# Patient Record
Sex: FEMALE | Race: WHITE | NOT HISPANIC OR LATINO | Employment: OTHER | URBAN - METROPOLITAN AREA
[De-identification: names, ages, dates, MRNs, and addresses within clinical notes are randomized per-mention and may not be internally consistent; named-entity substitution may affect disease eponyms.]

---

## 2018-04-09 ENCOUNTER — HOSPITAL ENCOUNTER (OUTPATIENT)
Dept: RADIOLOGY | Facility: MEDICAL CENTER | Age: 83
End: 2018-04-09

## 2018-04-09 ENCOUNTER — HOSPITAL ENCOUNTER (INPATIENT)
Facility: MEDICAL CENTER | Age: 83
LOS: 9 days | DRG: 854 | End: 2018-04-18
Attending: EMERGENCY MEDICINE | Admitting: INTERNAL MEDICINE
Payer: MEDICARE

## 2018-04-09 DIAGNOSIS — L02.91 ABSCESS: ICD-10-CM

## 2018-04-09 DIAGNOSIS — S72.002A CLOSED FRACTURE OF LEFT HIP, INITIAL ENCOUNTER (HCC): ICD-10-CM

## 2018-04-09 DIAGNOSIS — S72.002S CLOSED LEFT HIP FRACTURE, SEQUELA: Chronic | ICD-10-CM

## 2018-04-09 DIAGNOSIS — M00.9 SEPTIC HIP (HCC): ICD-10-CM
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
## 2018-04-09 PROBLEM — R53.81 DEBILITY: Chronic | Status: ACTIVE | Noted: 2018-04-09

## 2018-04-09 PROBLEM — I10 HYPERTENSION: Chronic | Status: ACTIVE | Noted: 2018-04-09

## 2018-04-09 PROBLEM — K59.00 CONSTIPATION: Chronic | Status: ACTIVE | Noted: 2018-04-09

## 2018-04-09 PROBLEM — Z87.81 S/P LEFT HIP FRACTURE: Chronic | Status: ACTIVE | Noted: 2018-04-09

## 2018-04-09 PROBLEM — D64.9 ANEMIA: Status: ACTIVE | Noted: 2018-04-09

## 2018-04-09 PROBLEM — M86.9 OSTEOMYELITIS (HCC): Status: ACTIVE | Noted: 2018-04-09

## 2018-04-09 PROBLEM — F03.90 DEMENTIA (HCC): Chronic | Status: ACTIVE | Noted: 2018-04-09

## 2018-04-09 LAB
ALBUMIN SERPL BCP-MCNC: 2.2 G/DL (ref 3.2–4.9)
ALBUMIN/GLOB SERPL: 0.6 G/DL
ALP SERPL-CCNC: 82 U/L (ref 30–99)
ALT SERPL-CCNC: 11 U/L (ref 2–50)
ANION GAP SERPL CALC-SCNC: 10 MMOL/L (ref 0–11.9)
AST SERPL-CCNC: 17 U/L (ref 12–45)
BASOPHILS # BLD AUTO: 0.3 % (ref 0–1.8)
BASOPHILS # BLD: 0.06 K/UL (ref 0–0.12)
BILIRUB SERPL-MCNC: 0.4 MG/DL (ref 0.1–1.5)
BUN SERPL-MCNC: 32 MG/DL (ref 8–22)
CALCIUM SERPL-MCNC: 7.9 MG/DL (ref 8.5–10.5)
CHLORIDE SERPL-SCNC: 102 MMOL/L (ref 96–112)
CO2 SERPL-SCNC: 23 MMOL/L (ref 20–33)
CREAT SERPL-MCNC: 1.06 MG/DL (ref 0.5–1.4)
CRP SERPL HS-MCNC: 26.47 MG/DL (ref 0–0.75)
EKG IMPRESSION: NORMAL
EOSINOPHIL # BLD AUTO: 0.1 K/UL (ref 0–0.51)
EOSINOPHIL NFR BLD: 0.4 % (ref 0–6.9)
ERYTHROCYTE [DISTWIDTH] IN BLOOD BY AUTOMATED COUNT: 45.1 FL (ref 35.9–50)
ERYTHROCYTE [SEDIMENTATION RATE] IN BLOOD BY WESTERGREN METHOD: 104 MM/HOUR (ref 0–30)
GLOBULIN SER CALC-MCNC: 3.4 G/DL (ref 1.9–3.5)
GLUCOSE SERPL-MCNC: 75 MG/DL (ref 65–99)
HCT VFR BLD AUTO: 30 % (ref 37–47)
HGB BLD-MCNC: 10 G/DL (ref 12–16)
IMM GRANULOCYTES # BLD AUTO: 0.31 K/UL (ref 0–0.11)
IMM GRANULOCYTES NFR BLD AUTO: 1.3 % (ref 0–0.9)
LYMPHOCYTES # BLD AUTO: 2.38 K/UL (ref 1–4.8)
LYMPHOCYTES NFR BLD: 10.2 % (ref 22–41)
MCH RBC QN AUTO: 30.3 PG (ref 27–33)
MCHC RBC AUTO-ENTMCNC: 33.3 G/DL (ref 33.6–35)
MCV RBC AUTO: 90.9 FL (ref 81.4–97.8)
MONOCYTES # BLD AUTO: 1.11 K/UL (ref 0–0.85)
MONOCYTES NFR BLD AUTO: 4.7 % (ref 0–13.4)
NEUTROPHILS # BLD AUTO: 19.41 K/UL (ref 2–7.15)
NEUTROPHILS NFR BLD: 83.1 % (ref 44–72)
NRBC # BLD AUTO: 0 K/UL
NRBC BLD-RTO: 0 /100 WBC
PLATELET # BLD AUTO: 427 K/UL (ref 164–446)
PMV BLD AUTO: 9.5 FL (ref 9–12.9)
POTASSIUM SERPL-SCNC: 3.7 MMOL/L (ref 3.6–5.5)
PROT SERPL-MCNC: 5.6 G/DL (ref 6–8.2)
RBC # BLD AUTO: 3.3 M/UL (ref 4.2–5.4)
SODIUM SERPL-SCNC: 135 MMOL/L (ref 135–145)
WBC # BLD AUTO: 23.4 K/UL (ref 4.8–10.8)

## 2018-04-09 PROCEDURE — 160002 HCHG RECOVERY MINUTES (STAT): Performed by: ORTHOPAEDIC SURGERY

## 2018-04-09 PROCEDURE — 86140 C-REACTIVE PROTEIN: CPT

## 2018-04-09 PROCEDURE — 87070 CULTURE OTHR SPECIMN AEROBIC: CPT

## 2018-04-09 PROCEDURE — 80053 COMPREHEN METABOLIC PANEL: CPT

## 2018-04-09 PROCEDURE — 700111 HCHG RX REV CODE 636 W/ 250 OVERRIDE (IP): Performed by: STUDENT IN AN ORGANIZED HEALTH CARE EDUCATION/TRAINING PROGRAM

## 2018-04-09 PROCEDURE — 87205 SMEAR GRAM STAIN: CPT

## 2018-04-09 PROCEDURE — 85025 COMPLETE CBC W/AUTO DIFF WBC: CPT

## 2018-04-09 PROCEDURE — 36415 COLL VENOUS BLD VENIPUNCTURE: CPT

## 2018-04-09 PROCEDURE — 87075 CULTR BACTERIA EXCEPT BLOOD: CPT

## 2018-04-09 PROCEDURE — 500891 HCHG PACK, ORTHO MAJOR: Performed by: ORTHOPAEDIC SURGERY

## 2018-04-09 PROCEDURE — 770006 HCHG ROOM/CARE - MED/SURG/GYN SEMI*

## 2018-04-09 PROCEDURE — 160009 HCHG ANES TIME/MIN: Performed by: ORTHOPAEDIC SURGERY

## 2018-04-09 PROCEDURE — 160028 HCHG SURGERY MINUTES - 1ST 30 MINS LEVEL 3: Performed by: ORTHOPAEDIC SURGERY

## 2018-04-09 PROCEDURE — 500367 HCHG DRAIN KIT, HEMOVAC: Performed by: ORTHOPAEDIC SURGERY

## 2018-04-09 PROCEDURE — 700105 HCHG RX REV CODE 258: Performed by: STUDENT IN AN ORGANIZED HEALTH CARE EDUCATION/TRAINING PROGRAM

## 2018-04-09 PROCEDURE — 160035 HCHG PACU - 1ST 60 MINS PHASE I: Performed by: ORTHOPAEDIC SURGERY

## 2018-04-09 PROCEDURE — 501330 HCHG SET, CYSTO IRRIG TUBING: Performed by: ORTHOPAEDIC SURGERY

## 2018-04-09 PROCEDURE — 160039 HCHG SURGERY MINUTES - EA ADDL 1 MIN LEVEL 3: Performed by: ORTHOPAEDIC SURGERY

## 2018-04-09 PROCEDURE — 93005 ELECTROCARDIOGRAM TRACING: CPT | Performed by: EMERGENCY MEDICINE

## 2018-04-09 PROCEDURE — 700111 HCHG RX REV CODE 636 W/ 250 OVERRIDE (IP)

## 2018-04-09 PROCEDURE — 87040 BLOOD CULTURE FOR BACTERIA: CPT

## 2018-04-09 PROCEDURE — 700105 HCHG RX REV CODE 258: Performed by: EMERGENCY MEDICINE

## 2018-04-09 PROCEDURE — 0J9M0ZZ DRAINAGE OF LEFT UPPER LEG SUBCUTANEOUS TISSUE AND FASCIA, OPEN APPROACH: ICD-10-PCS | Performed by: ORTHOPAEDIC SURGERY

## 2018-04-09 PROCEDURE — A6402 STERILE GAUZE <= 16 SQ IN: HCPCS | Performed by: ORTHOPAEDIC SURGERY

## 2018-04-09 PROCEDURE — 85652 RBC SED RATE AUTOMATED: CPT

## 2018-04-09 PROCEDURE — 700101 HCHG RX REV CODE 250

## 2018-04-09 PROCEDURE — 160036 HCHG PACU - EA ADDL 30 MINS PHASE I: Performed by: ORTHOPAEDIC SURGERY

## 2018-04-09 PROCEDURE — 99291 CRITICAL CARE FIRST HOUR: CPT

## 2018-04-09 PROCEDURE — 501838 HCHG SUTURE GENERAL: Performed by: ORTHOPAEDIC SURGERY

## 2018-04-09 PROCEDURE — 160048 HCHG OR STATISTICAL LEVEL 1-5: Performed by: ORTHOPAEDIC SURGERY

## 2018-04-09 RX ORDER — AMITRIPTYLINE HYDROCHLORIDE 25 MG/1
25 TABLET, FILM COATED ORAL
Status: ON HOLD | COMMUNITY
End: 2018-04-18

## 2018-04-09 RX ORDER — MAGNESIUM HYDROXIDE 1200 MG/15ML
LIQUID ORAL
Status: DISCONTINUED | OUTPATIENT
Start: 2018-04-09 | End: 2018-04-10 | Stop reason: HOSPADM

## 2018-04-09 RX ORDER — SODIUM CHLORIDE 9 MG/ML
INJECTION, SOLUTION INTRAVENOUS ONCE
Status: COMPLETED | OUTPATIENT
Start: 2018-04-09 | End: 2018-04-09

## 2018-04-09 RX ORDER — POLYETHYLENE GLYCOL 3350 17 G/17G
1 POWDER, FOR SOLUTION ORAL
Status: DISCONTINUED | OUTPATIENT
Start: 2018-04-09 | End: 2018-04-18 | Stop reason: HOSPADM

## 2018-04-09 RX ORDER — CHLORTHALIDONE 25 MG/1
25 TABLET ORAL DAILY
Status: ON HOLD | COMMUNITY
End: 2018-04-18

## 2018-04-09 RX ORDER — MECLIZINE HYDROCHLORIDE 25 MG/1
25 TABLET ORAL 3 TIMES DAILY PRN
Status: ON HOLD | COMMUNITY
End: 2018-04-18

## 2018-04-09 RX ORDER — POTASSIUM CHLORIDE 20 MEQ/1
20 TABLET, EXTENDED RELEASE ORAL 2 TIMES DAILY
Status: ON HOLD | COMMUNITY
End: 2018-04-18

## 2018-04-09 RX ORDER — ACETAMINOPHEN 325 MG/1
650 TABLET ORAL EVERY 6 HOURS PRN
Status: DISCONTINUED | OUTPATIENT
Start: 2018-04-09 | End: 2018-04-10

## 2018-04-09 RX ORDER — BISACODYL 10 MG
10 SUPPOSITORY, RECTAL RECTAL
Status: DISCONTINUED | OUTPATIENT
Start: 2018-04-09 | End: 2018-04-18 | Stop reason: HOSPADM

## 2018-04-09 RX ORDER — LISINOPRIL 10 MG/1
10 TABLET ORAL DAILY
Status: ON HOLD | COMMUNITY
End: 2018-04-18

## 2018-04-09 RX ORDER — NAPROXEN SODIUM 220 MG
440 TABLET ORAL 2 TIMES DAILY WITH MEALS
Status: ON HOLD | COMMUNITY
End: 2018-04-18

## 2018-04-09 RX ORDER — AMOXICILLIN 250 MG
2 CAPSULE ORAL 2 TIMES DAILY
Status: DISCONTINUED | OUTPATIENT
Start: 2018-04-09 | End: 2018-04-18 | Stop reason: HOSPADM

## 2018-04-09 RX ADMIN — SODIUM CHLORIDE: 9 INJECTION, SOLUTION INTRAVENOUS at 19:44

## 2018-04-09 ASSESSMENT — ENCOUNTER SYMPTOMS
FOCAL WEAKNESS: 0
BLURRED VISION: 0
SINUS PAIN: 0
LOSS OF CONSCIOUSNESS: 0
PALPITATIONS: 0
HEADACHES: 0
SENSORY CHANGE: 0
DOUBLE VISION: 0
SORE THROAT: 0
COUGH: 0
SPEECH CHANGE: 0
DEPRESSION: 0
BLOOD IN STOOL: 1
SEIZURES: 0
SPUTUM PRODUCTION: 0
NERVOUS/ANXIOUS: 0
DIARRHEA: 0
FALLS: 1
CONSTIPATION: 1
MEMORY LOSS: 1
ABDOMINAL PAIN: 0
WEAKNESS: 1
FEVER: 0
SHORTNESS OF BREATH: 0
CHILLS: 0
PND: 0
NAUSEA: 1
VOMITING: 0
DIZZINESS: 0
ORTHOPNEA: 0

## 2018-04-09 ASSESSMENT — PAIN SCALES - GENERAL
PAINLEVEL_OUTOF10: 0
PAINLEVEL_OUTOF10: 0
PAINLEVEL_OUTOF10: 5
PAINLEVEL_OUTOF10: 0

## 2018-04-09 ASSESSMENT — LIFESTYLE VARIABLES: DO YOU DRINK ALCOHOL: NO

## 2018-04-10 ENCOUNTER — APPOINTMENT (OUTPATIENT)
Dept: RADIOLOGY | Facility: MEDICAL CENTER | Age: 83
DRG: 854 | End: 2018-04-10
Attending: ORTHOPAEDIC SURGERY
Payer: MEDICARE

## 2018-04-10 ENCOUNTER — RESOLUTE PROFESSIONAL BILLING HOSPITAL PROF FEE (OUTPATIENT)
Dept: HOSPITALIST | Facility: MEDICAL CENTER | Age: 83
End: 2018-04-10
Payer: MEDICARE

## 2018-04-10 PROBLEM — R52 PAIN: Status: ACTIVE | Noted: 2018-04-10

## 2018-04-10 LAB
25(OH)D3 SERPL-MCNC: 13 NG/ML (ref 30–100)
ALBUMIN SERPL BCP-MCNC: 2.1 G/DL (ref 3.2–4.9)
ALBUMIN/GLOB SERPL: 0.7 G/DL
ALP SERPL-CCNC: 79 U/L (ref 30–99)
ALT SERPL-CCNC: 10 U/L (ref 2–50)
ANION GAP SERPL CALC-SCNC: 8 MMOL/L (ref 0–11.9)
APPEARANCE UR: ABNORMAL
AST SERPL-CCNC: 17 U/L (ref 12–45)
BACTERIA #/AREA URNS HPF: ABNORMAL /HPF
BASOPHILS # BLD AUTO: 0.4 % (ref 0–1.8)
BASOPHILS # BLD: 0.1 K/UL (ref 0–0.12)
BILIRUB SERPL-MCNC: 0.4 MG/DL (ref 0.1–1.5)
BILIRUB UR QL STRIP.AUTO: NEGATIVE
BUN SERPL-MCNC: 30 MG/DL (ref 8–22)
CALCIUM SERPL-MCNC: 8.3 MG/DL (ref 8.5–10.5)
CHLORIDE SERPL-SCNC: 103 MMOL/L (ref 96–112)
CO2 SERPL-SCNC: 24 MMOL/L (ref 20–33)
COLOR UR: YELLOW
CREAT SERPL-MCNC: 0.95 MG/DL (ref 0.5–1.4)
EKG IMPRESSION: NORMAL
EKG IMPRESSION: NORMAL
EOSINOPHIL # BLD AUTO: 0.03 K/UL (ref 0–0.51)
EOSINOPHIL NFR BLD: 0.1 % (ref 0–6.9)
EPI CELLS #/AREA URNS HPF: ABNORMAL /HPF
ERYTHROCYTE [DISTWIDTH] IN BLOOD BY AUTOMATED COUNT: 45.8 FL (ref 35.9–50)
EST. AVERAGE GLUCOSE BLD GHB EST-MCNC: 120 MG/DL
FERRITIN SERPL-MCNC: 365.4 NG/ML (ref 10–291)
FOLATE SERPL-MCNC: >24 NG/ML
GLOBULIN SER CALC-MCNC: 3.1 G/DL (ref 1.9–3.5)
GLUCOSE SERPL-MCNC: 75 MG/DL (ref 65–99)
GLUCOSE UR STRIP.AUTO-MCNC: NEGATIVE MG/DL
HBA1C MFR BLD: 5.8 % (ref 0–5.6)
HCT VFR BLD AUTO: 30.9 % (ref 37–47)
HGB BLD-MCNC: 10.1 G/DL (ref 12–16)
IMM GRANULOCYTES # BLD AUTO: 0.32 K/UL (ref 0–0.11)
IMM GRANULOCYTES NFR BLD AUTO: 1.3 % (ref 0–0.9)
IRON SATN MFR SERPL: 12 % (ref 15–55)
IRON SERPL-MCNC: 17 UG/DL (ref 40–170)
KETONES UR STRIP.AUTO-MCNC: ABNORMAL MG/DL
LEUKOCYTE ESTERASE UR QL STRIP.AUTO: NEGATIVE
LYMPHOCYTES # BLD AUTO: 2.52 K/UL (ref 1–4.8)
LYMPHOCYTES NFR BLD: 10.4 % (ref 22–41)
MCH RBC QN AUTO: 29.7 PG (ref 27–33)
MCHC RBC AUTO-ENTMCNC: 32.7 G/DL (ref 33.6–35)
MCV RBC AUTO: 90.9 FL (ref 81.4–97.8)
MICRO URNS: ABNORMAL
MONOCYTES # BLD AUTO: 0.92 K/UL (ref 0–0.85)
MONOCYTES NFR BLD AUTO: 3.8 % (ref 0–13.4)
NEUTROPHILS # BLD AUTO: 20.45 K/UL (ref 2–7.15)
NEUTROPHILS NFR BLD: 84 % (ref 44–72)
NITRITE UR QL STRIP.AUTO: NEGATIVE
NRBC # BLD AUTO: 0 K/UL
NRBC BLD-RTO: 0 /100 WBC
PH UR STRIP.AUTO: 6 [PH]
PLATELET # BLD AUTO: 452 K/UL (ref 164–446)
PMV BLD AUTO: 9.2 FL (ref 9–12.9)
POTASSIUM SERPL-SCNC: 3.8 MMOL/L (ref 3.6–5.5)
PROT SERPL-MCNC: 5.2 G/DL (ref 6–8.2)
PROT UR QL STRIP: 30 MG/DL
RBC # BLD AUTO: 3.4 M/UL (ref 4.2–5.4)
RBC # URNS HPF: ABNORMAL /HPF
RBC UR QL AUTO: ABNORMAL
SODIUM SERPL-SCNC: 135 MMOL/L (ref 135–145)
SP GR UR STRIP.AUTO: 1.03
TIBC SERPL-MCNC: 137 UG/DL (ref 250–450)
TSH SERPL DL<=0.005 MIU/L-ACNC: 3.44 UIU/ML (ref 0.38–5.33)
UROBILINOGEN UR STRIP.AUTO-MCNC: NORMAL MG/DL
VIT B12 SERPL-MCNC: 1109 PG/ML (ref 211–911)
WBC # BLD AUTO: 24.3 K/UL (ref 4.8–10.8)
WBC #/AREA URNS HPF: ABNORMAL /HPF

## 2018-04-10 PROCEDURE — 700102 HCHG RX REV CODE 250 W/ 637 OVERRIDE(OP): Performed by: ORTHOPAEDIC SURGERY

## 2018-04-10 PROCEDURE — 700111 HCHG RX REV CODE 636 W/ 250 OVERRIDE (IP): Performed by: ORTHOPAEDIC SURGERY

## 2018-04-10 PROCEDURE — 97166 OT EVAL MOD COMPLEX 45 MIN: CPT

## 2018-04-10 PROCEDURE — 700102 HCHG RX REV CODE 250 W/ 637 OVERRIDE(OP): Performed by: INTERNAL MEDICINE

## 2018-04-10 PROCEDURE — 82607 VITAMIN B-12: CPT

## 2018-04-10 PROCEDURE — 82306 VITAMIN D 25 HYDROXY: CPT

## 2018-04-10 PROCEDURE — G8979 MOBILITY GOAL STATUS: HCPCS | Mod: CI

## 2018-04-10 PROCEDURE — 84443 ASSAY THYROID STIM HORMONE: CPT

## 2018-04-10 PROCEDURE — 85025 COMPLETE CBC W/AUTO DIFF WBC: CPT

## 2018-04-10 PROCEDURE — 82746 ASSAY OF FOLIC ACID SERUM: CPT

## 2018-04-10 PROCEDURE — 700105 HCHG RX REV CODE 258: Performed by: INTERNAL MEDICINE

## 2018-04-10 PROCEDURE — A9270 NON-COVERED ITEM OR SERVICE: HCPCS | Performed by: ORTHOPAEDIC SURGERY

## 2018-04-10 PROCEDURE — 73502 X-RAY EXAM HIP UNI 2-3 VIEWS: CPT | Mod: LT

## 2018-04-10 PROCEDURE — 700111 HCHG RX REV CODE 636 W/ 250 OVERRIDE (IP): Performed by: INTERNAL MEDICINE

## 2018-04-10 PROCEDURE — 36415 COLL VENOUS BLD VENIPUNCTURE: CPT

## 2018-04-10 PROCEDURE — 93010 ELECTROCARDIOGRAM REPORT: CPT | Mod: 76 | Performed by: INTERNAL MEDICINE

## 2018-04-10 PROCEDURE — 82728 ASSAY OF FERRITIN: CPT

## 2018-04-10 PROCEDURE — 770001 HCHG ROOM/CARE - MED/SURG/GYN PRIV*

## 2018-04-10 PROCEDURE — 81001 URINALYSIS AUTO W/SCOPE: CPT

## 2018-04-10 PROCEDURE — 80053 COMPREHEN METABOLIC PANEL: CPT

## 2018-04-10 PROCEDURE — 83550 IRON BINDING TEST: CPT

## 2018-04-10 PROCEDURE — 83540 ASSAY OF IRON: CPT

## 2018-04-10 PROCEDURE — A9270 NON-COVERED ITEM OR SERVICE: HCPCS | Performed by: INTERNAL MEDICINE

## 2018-04-10 PROCEDURE — 700105 HCHG RX REV CODE 258

## 2018-04-10 PROCEDURE — G8978 MOBILITY CURRENT STATUS: HCPCS | Mod: CK

## 2018-04-10 PROCEDURE — 99223 1ST HOSP IP/OBS HIGH 75: CPT | Mod: AI,GC | Performed by: INTERNAL MEDICINE

## 2018-04-10 PROCEDURE — 97162 PT EVAL MOD COMPLEX 30 MIN: CPT

## 2018-04-10 PROCEDURE — A9270 NON-COVERED ITEM OR SERVICE: HCPCS | Performed by: STUDENT IN AN ORGANIZED HEALTH CARE EDUCATION/TRAINING PROGRAM

## 2018-04-10 PROCEDURE — 302135 SEQUENTIAL COMPRESSION MACHINE: Performed by: ORTHOPAEDIC SURGERY

## 2018-04-10 PROCEDURE — 87086 URINE CULTURE/COLONY COUNT: CPT

## 2018-04-10 PROCEDURE — G8988 SELF CARE GOAL STATUS: HCPCS | Mod: CI

## 2018-04-10 PROCEDURE — 83036 HEMOGLOBIN GLYCOSYLATED A1C: CPT

## 2018-04-10 PROCEDURE — 93005 ELECTROCARDIOGRAM TRACING: CPT | Performed by: HOSPITALIST

## 2018-04-10 PROCEDURE — 93005 ELECTROCARDIOGRAM TRACING: CPT | Performed by: ORTHOPAEDIC SURGERY

## 2018-04-10 PROCEDURE — 700102 HCHG RX REV CODE 250 W/ 637 OVERRIDE(OP): Performed by: STUDENT IN AN ORGANIZED HEALTH CARE EDUCATION/TRAINING PROGRAM

## 2018-04-10 PROCEDURE — 700111 HCHG RX REV CODE 636 W/ 250 OVERRIDE (IP): Performed by: STUDENT IN AN ORGANIZED HEALTH CARE EDUCATION/TRAINING PROGRAM

## 2018-04-10 PROCEDURE — G8987 SELF CARE CURRENT STATUS: HCPCS | Mod: CK

## 2018-04-10 PROCEDURE — 700112 HCHG RX REV CODE 229: Performed by: ORTHOPAEDIC SURGERY

## 2018-04-10 RX ORDER — OXYCODONE HYDROCHLORIDE 5 MG/1
5 TABLET ORAL
Status: DISCONTINUED | OUTPATIENT
Start: 2018-04-10 | End: 2018-04-18 | Stop reason: HOSPADM

## 2018-04-10 RX ORDER — ACETAMINOPHEN 325 MG/1
650 TABLET ORAL ONCE
Status: COMPLETED | OUTPATIENT
Start: 2018-04-10 | End: 2018-04-10

## 2018-04-10 RX ORDER — ENEMA 19; 7 G/133ML; G/133ML
1 ENEMA RECTAL
Status: DISCONTINUED | OUTPATIENT
Start: 2018-04-10 | End: 2018-04-10

## 2018-04-10 RX ORDER — HALOPERIDOL 5 MG/ML
1 INJECTION INTRAMUSCULAR EVERY 6 HOURS PRN
Status: DISCONTINUED | OUTPATIENT
Start: 2018-04-10 | End: 2018-04-18 | Stop reason: HOSPADM

## 2018-04-10 RX ORDER — AMOXICILLIN 250 MG
1 CAPSULE ORAL NIGHTLY
Status: DISCONTINUED | OUTPATIENT
Start: 2018-04-10 | End: 2018-04-18 | Stop reason: HOSPADM

## 2018-04-10 RX ORDER — DIPHENHYDRAMINE HCL 25 MG
25 TABLET ORAL ONCE
Status: COMPLETED | OUTPATIENT
Start: 2018-04-10 | End: 2018-04-10

## 2018-04-10 RX ORDER — BISACODYL 10 MG
10 SUPPOSITORY, RECTAL RECTAL
Status: DISCONTINUED | OUTPATIENT
Start: 2018-04-10 | End: 2018-04-18 | Stop reason: HOSPADM

## 2018-04-10 RX ORDER — ACETAMINOPHEN 325 MG/1
650 TABLET ORAL EVERY 6 HOURS
Status: DISPENSED | OUTPATIENT
Start: 2018-04-10 | End: 2018-04-14

## 2018-04-10 RX ORDER — SODIUM CHLORIDE 9 MG/ML
INJECTION, SOLUTION INTRAVENOUS
Status: COMPLETED
Start: 2018-04-10 | End: 2018-04-10

## 2018-04-10 RX ORDER — SCOLOPAMINE TRANSDERMAL SYSTEM 1 MG/1
1 PATCH, EXTENDED RELEASE TRANSDERMAL
Status: DISCONTINUED | OUTPATIENT
Start: 2018-04-10 | End: 2018-04-18 | Stop reason: HOSPADM

## 2018-04-10 RX ORDER — OXYCODONE HYDROCHLORIDE 10 MG/1
10 TABLET ORAL
Status: DISCONTINUED | OUTPATIENT
Start: 2018-04-10 | End: 2018-04-18 | Stop reason: HOSPADM

## 2018-04-10 RX ORDER — DEXAMETHASONE SODIUM PHOSPHATE 4 MG/ML
4 INJECTION, SOLUTION INTRA-ARTICULAR; INTRALESIONAL; INTRAMUSCULAR; INTRAVENOUS; SOFT TISSUE
Status: DISCONTINUED | OUTPATIENT
Start: 2018-04-10 | End: 2018-04-18 | Stop reason: HOSPADM

## 2018-04-10 RX ORDER — KETOROLAC TROMETHAMINE 30 MG/ML
15 INJECTION, SOLUTION INTRAMUSCULAR; INTRAVENOUS EVERY 6 HOURS
Status: DISCONTINUED | OUTPATIENT
Start: 2018-04-10 | End: 2018-04-11

## 2018-04-10 RX ORDER — DIPHENHYDRAMINE HYDROCHLORIDE 50 MG/ML
25 INJECTION INTRAMUSCULAR; INTRAVENOUS ONCE
Status: COMPLETED | OUTPATIENT
Start: 2018-04-10 | End: 2018-04-10

## 2018-04-10 RX ORDER — DOCUSATE SODIUM 100 MG/1
100 CAPSULE, LIQUID FILLED ORAL 2 TIMES DAILY
Status: DISCONTINUED | OUTPATIENT
Start: 2018-04-10 | End: 2018-04-18 | Stop reason: HOSPADM

## 2018-04-10 RX ORDER — ONDANSETRON 2 MG/ML
4 INJECTION INTRAMUSCULAR; INTRAVENOUS EVERY 4 HOURS PRN
Status: DISCONTINUED | OUTPATIENT
Start: 2018-04-10 | End: 2018-04-18 | Stop reason: HOSPADM

## 2018-04-10 RX ORDER — POLYETHYLENE GLYCOL 3350 17 G/17G
1 POWDER, FOR SOLUTION ORAL 2 TIMES DAILY PRN
Status: DISCONTINUED | OUTPATIENT
Start: 2018-04-10 | End: 2018-04-18 | Stop reason: HOSPADM

## 2018-04-10 RX ORDER — LISINOPRIL 10 MG/1
10 TABLET ORAL DAILY
Status: DISCONTINUED | OUTPATIENT
Start: 2018-04-10 | End: 2018-04-10

## 2018-04-10 RX ORDER — AMOXICILLIN 250 MG
1 CAPSULE ORAL
Status: DISCONTINUED | OUTPATIENT
Start: 2018-04-10 | End: 2018-04-18 | Stop reason: HOSPADM

## 2018-04-10 RX ORDER — CHLORTHALIDONE 25 MG/1
25 TABLET ORAL DAILY
Status: DISCONTINUED | OUTPATIENT
Start: 2018-04-10 | End: 2018-04-10

## 2018-04-10 RX ORDER — AMITRIPTYLINE HYDROCHLORIDE 25 MG/1
25 TABLET, FILM COATED ORAL
Status: DISCONTINUED | OUTPATIENT
Start: 2018-04-10 | End: 2018-04-18 | Stop reason: HOSPADM

## 2018-04-10 RX ORDER — DIPHENHYDRAMINE HYDROCHLORIDE 50 MG/ML
25 INJECTION INTRAMUSCULAR; INTRAVENOUS EVERY 6 HOURS PRN
Status: DISCONTINUED | OUTPATIENT
Start: 2018-04-10 | End: 2018-04-18 | Stop reason: HOSPADM

## 2018-04-10 RX ADMIN — VITAMIN D, TAB 1000IU (100/BT) 1000 UNITS: 25 TAB at 08:47

## 2018-04-10 RX ADMIN — CEFTRIAXONE 2 G: 2 INJECTION, POWDER, FOR SOLUTION INTRAMUSCULAR; INTRAVENOUS at 06:50

## 2018-04-10 RX ADMIN — DIPHENHYDRAMINE HYDROCHLORIDE 25 MG: 50 INJECTION, SOLUTION INTRAMUSCULAR; INTRAVENOUS at 09:58

## 2018-04-10 RX ADMIN — OXYCODONE HYDROCHLORIDE 5 MG: 5 TABLET ORAL at 03:36

## 2018-04-10 RX ADMIN — AMITRIPTYLINE HYDROCHLORIDE 25 MG: 25 TABLET, FILM COATED ORAL at 20:04

## 2018-04-10 RX ADMIN — OXYCODONE HYDROCHLORIDE 10 MG: 10 TABLET ORAL at 06:56

## 2018-04-10 RX ADMIN — MAGNESIUM HYDROXIDE 30 ML: 400 SUSPENSION ORAL at 08:54

## 2018-04-10 RX ADMIN — KETOROLAC TROMETHAMINE 15 MG: 30 INJECTION, SOLUTION INTRAMUSCULAR at 03:25

## 2018-04-10 RX ADMIN — OXYCODONE HYDROCHLORIDE 10 MG: 10 TABLET ORAL at 16:26

## 2018-04-10 RX ADMIN — IRON SUCROSE 200 MG: 20 INJECTION, SOLUTION INTRAVENOUS at 09:58

## 2018-04-10 RX ADMIN — BISACODYL 10 MG: 10 SUPPOSITORY RECTAL at 16:26

## 2018-04-10 RX ADMIN — VANCOMYCIN HYDROCHLORIDE 1500 MG: 100 INJECTION, POWDER, LYOPHILIZED, FOR SOLUTION INTRAVENOUS at 03:24

## 2018-04-10 RX ADMIN — ACETAMINOPHEN 650 MG: 325 TABLET, FILM COATED ORAL at 09:58

## 2018-04-10 RX ADMIN — STANDARDIZED SENNA CONCENTRATE AND DOCUSATE SODIUM 2 TABLET: 8.6; 5 TABLET, FILM COATED ORAL at 08:47

## 2018-04-10 RX ADMIN — DOCUSATE SODIUM 100 MG: 100 CAPSULE ORAL at 08:47

## 2018-04-10 RX ADMIN — ACETAMINOPHEN 650 MG: 325 TABLET, FILM COATED ORAL at 13:12

## 2018-04-10 RX ADMIN — STANDARDIZED SENNA CONCENTRATE AND DOCUSATE SODIUM 2 TABLET: 8.6; 5 TABLET, FILM COATED ORAL at 20:01

## 2018-04-10 RX ADMIN — VANCOMYCIN HYDROCHLORIDE 600 MG: 100 INJECTION, POWDER, LYOPHILIZED, FOR SOLUTION INTRAVENOUS at 14:48

## 2018-04-10 RX ADMIN — KETOROLAC TROMETHAMINE 15 MG: 30 INJECTION, SOLUTION INTRAMUSCULAR at 08:47

## 2018-04-10 RX ADMIN — DOCUSATE SODIUM 100 MG: 100 CAPSULE ORAL at 20:01

## 2018-04-10 RX ADMIN — SODIUM CHLORIDE: 9 INJECTION, SOLUTION INTRAVENOUS at 03:30

## 2018-04-10 RX ADMIN — HYDROMORPHONE HYDROCHLORIDE 0.5 MG: 10 INJECTION, SOLUTION INTRAMUSCULAR; INTRAVENOUS; SUBCUTANEOUS at 05:43

## 2018-04-10 RX ADMIN — ACETAMINOPHEN 650 MG: 325 TABLET, FILM COATED ORAL at 23:35

## 2018-04-10 RX ADMIN — KETOROLAC TROMETHAMINE 15 MG: 30 INJECTION, SOLUTION INTRAMUSCULAR at 20:03

## 2018-04-10 RX ADMIN — ACETAMINOPHEN 650 MG: 325 TABLET, FILM COATED ORAL at 05:43

## 2018-04-10 RX ADMIN — KETOROLAC TROMETHAMINE 15 MG: 30 INJECTION, SOLUTION INTRAMUSCULAR at 14:49

## 2018-04-10 ASSESSMENT — ENCOUNTER SYMPTOMS
HEMOPTYSIS: 0
NECK PAIN: 0
COUGH: 0
TREMORS: 0
SEIZURES: 0
CHILLS: 0
SORE THROAT: 0
BACK PAIN: 0
SHORTNESS OF BREATH: 0
DIZZINESS: 0
VOMITING: 0
FEVER: 0
BLURRED VISION: 0
DOUBLE VISION: 0
PALPITATIONS: 0
BRUISES/BLEEDS EASILY: 0
NAUSEA: 0

## 2018-04-10 ASSESSMENT — PAIN SCALES - GENERAL
PAINLEVEL_OUTOF10: 0
PAINLEVEL_OUTOF10: 3
PAINLEVEL_OUTOF10: 10
PAINLEVEL_OUTOF10: 0
PAINLEVEL_OUTOF10: 7
PAINLEVEL_OUTOF10: 10
PAINLEVEL_OUTOF10: 7
PAINLEVEL_OUTOF10: 7
PAINLEVEL_OUTOF10: 2
PAINLEVEL_OUTOF10: 3
PAINLEVEL_OUTOF10: 10
PAINLEVEL_OUTOF10: 5

## 2018-04-10 ASSESSMENT — COGNITIVE AND FUNCTIONAL STATUS - GENERAL
DRESSING REGULAR UPPER BODY CLOTHING: A LITTLE
SUGGESTED CMS G CODE MODIFIER MOBILITY: CM
TURNING FROM BACK TO SIDE WHILE IN FLAT BAD: A LOT
CLIMB 3 TO 5 STEPS WITH RAILING: TOTAL
STANDING UP FROM CHAIR USING ARMS: TOTAL
TOILETING: A LOT
DAILY ACTIVITIY SCORE: 15
HELP NEEDED FOR BATHING: A LOT
SUGGESTED CMS G CODE MODIFIER DAILY ACTIVITY: CK
WALKING IN HOSPITAL ROOM: TOTAL
DRESSING REGULAR LOWER BODY CLOTHING: A LOT
PERSONAL GROOMING: A LITTLE
EATING MEALS: A LITTLE
MOVING FROM LYING ON BACK TO SITTING ON SIDE OF FLAT BED: UNABLE
MOVING TO AND FROM BED TO CHAIR: UNABLE
MOBILITY SCORE: 7

## 2018-04-10 ASSESSMENT — PATIENT HEALTH QUESTIONNAIRE - PHQ9
3. TROUBLE FALLING OR STAYING ASLEEP OR SLEEPING TOO MUCH: MORE THAN HALF THE DAYS
9. THOUGHTS THAT YOU WOULD BE BETTER OFF DEAD, OR OF HURTING YOURSELF: NOT AT ALL
SUM OF ALL RESPONSES TO PHQ QUESTIONS 1-9: 11
5. POOR APPETITE OR OVEREATING: MORE THAN HALF THE DAYS
1. LITTLE INTEREST OR PLEASURE IN DOING THINGS: SEVERAL DAYS
8. MOVING OR SPEAKING SO SLOWLY THAT OTHER PEOPLE COULD HAVE NOTICED. OR THE OPPOSITE, BEING SO FIGETY OR RESTLESS THAT YOU HAVE BEEN MOVING AROUND A LOT MORE THAN USUAL: NOT AT ALL
6. FEELING BAD ABOUT YOURSELF - OR THAT YOU ARE A FAILURE OR HAVE LET YOURSELF OR YOUR FAMILY DOWN: SEVERAL DAYS
2. FEELING DOWN, DEPRESSED, IRRITABLE, OR HOPELESS: SEVERAL DAYS
4. FEELING TIRED OR HAVING LITTLE ENERGY: NEARLY EVERY DAY
SUM OF ALL RESPONSES TO PHQ9 QUESTIONS 1 AND 2: 2
7. TROUBLE CONCENTRATING ON THINGS, SUCH AS READING THE NEWSPAPER OR WATCHING TELEVISION: SEVERAL DAYS

## 2018-04-10 ASSESSMENT — LIFESTYLE VARIABLES
EVER_SMOKED: YES
DO YOU DRINK ALCOHOL: NO

## 2018-04-10 ASSESSMENT — GAIT ASSESSMENTS: GAIT LEVEL OF ASSIST: UNABLE TO PARTICIPATE

## 2018-04-10 ASSESSMENT — ACTIVITIES OF DAILY LIVING (ADL): TOILETING: INDEPENDENT

## 2018-04-10 NOTE — ASSESSMENT & PLAN NOTE
Identified on CT scan, no MRI available. Transferred from [facility] for Left femoral head fracture & dislocation associated with surrounding abscess that has spread to rectum which were found on  Imaging study. ED physician consulted Dr. Patel (Ortho) & pt was taken to OR urgently. Pt did not have any implants in the effected area. Vanco+Ceftriaxone changed to Unasyn on 04/12. PICC placed for long term abx.  - continue Unasyn  - appreciate ID recs, for ertapenem at discharge to SNF  - continue postoperative pain control with scheduled acetaminophen, prn oxycodone  - continue vitamin D  - PT/OT  - transfer to SNF today for PT, OT, IV antibiotics

## 2018-04-10 NOTE — SENIOR ADMIT NOTE
HPI     86 yo woman with PMhx of admitted as a trnasfer from [facility] for left hip septic arthritis with perirectal abscess. She fell on her left hip from her wheelchair at home 3 weeks ago. Since then, she had worsening rectal pain with intermittent bloody stools (not unusual for her, reports hemorrhoids). CT imaging at [facility] shows left femoral head fracture with dislocation and surrounding abscess that has spread to the rectum. She describes most pain in her rectal area today. Denies fevers, chills, nausea, vomiting or abdominal pain.     She was taken to the OR prior to my examination since orthopedics was consulted immediately in the ED. She was examined after returning from the OR.     Discussed with Dr. Patel -- ~200 cc purulent fluid removed from left hip. He reviewed CT abd imaging from [facility] and not convinced this represents rectal abscess. Rather septic arthritis with abscess around left hip.       Physical exam:     Gen: elderly woman in pain, in mild distress post operatively   HENT: MM dry.   Resp: CTAB with no w/r/r   Cardio: tachycardia with irregular rhythm   Rectal: significant tenderness and perianal pain. No swelling or erythema of the perianal area. No eber blood.   Extremities: left hip with bandage over incision site, drain in place. No edema   Neuro: A&O x 4. No focal deficits.       Imaging:     Reviewed CT abdomen performed at [facility] with Dr. Patel. He is not convinced this represents rectal abscess. Rather abscess around left hip.   CBC with leukocytosis and left shift. Elevated  and CRP 26.5.   GFR 49       A / P:     # Septic arthritis   # Left hip osteomyelitis   Source control in OR with Dr. Patel. Fluid cx pending. Blood cx.   Sepsis protocol with IV fluids and abx -- vancomycin and ceftriaxone. Elevated ESR and CRP. ID consulted by orthopedics, follow up in AM.       # Closed left hip fracture   - s/p trauma after fall at home 3 weeks ago. S/p ortho repair today in  OR   - PT / OT consulted. Pain control and vitamin D supplementation.       # Perianal pain   [facility] CT imaging report states perirectal abscess. Reviewed imaging with orthopedics today who is not convinced this represents true abscess.   - exam reveals significant TTP but no swelling in perianal area   - recommend re imaging pelvis and rectum after IV fluid administration given her decreased GFR       # Anemia   - chronic. Iron panel, TIBC, B12 and folate, retic count.   - rectal exam negative for gross blood today       # Hypertension   Hold diuretics in setting of sepsis today, especially when treated under sepsis protocol.       # Elevated creatinine   Suspect this is CKD, but do not have records. Monitor and obtain urine electrolytes as appropriate.       DVT ppx: SCD and jeni hose

## 2018-04-10 NOTE — ASSESSMENT & PLAN NOTE
Age 87, admitted for Left femoral head fracture & dislocation associated with surrounding abscess that has spread to rectum. GLF 3 weeks ago resulting in patient becoming bed bound. Pt used wheelchair to ambulate at baseline.  - continue vitamin D supplementation  - continue pressure ulcer prevention protocol   - PT/OT   - transfer to SNF today

## 2018-04-10 NOTE — ASSESSMENT & PLAN NOTE
- pain after ortho Sx  - pain control by Sx   - continue ketorolac scheduled & oxy 5 mg / 10 mg, Dilaudid prn

## 2018-04-10 NOTE — ED NOTES
[facility] see triage note from this RN for details. Pt hooked to monitor- VSS. Labs pulled from existing PIV. Fall precautions in place. Call bell in reach. Blankets provided. Awaiting MD eval/orders

## 2018-04-10 NOTE — THERAPY
"Occupational Therapy Evaluation completed.   Functional Status:  Min A supine to sit.  Max A LB dressing.  Pt sat EOB ~6 min, then refused transfer to chair or further activity.  Pt returned to supine with mod A.  Pt limited by pain/anxiety.  Pt is WC bound at baseline but was independent with self-care and ADL transfers.  Plan of Care: Will benefit from Occupational Therapy 3 times per week  Discharge Recommendations:  Equipment: Will Continue to Assess for Equipment Needs. Pt will likely required further therapy at SNF prior to DC home.    See \"Rehab Therapy-Acute\" Patient Summary Report for complete documentation.    "

## 2018-04-10 NOTE — PROGRESS NOTES
"Pharmacy Kinetics 87 y.o. female on vancomycin day # 1 4/10/2018    Currently on Vancomycin 1500 mg once followed by iv 600 mg (10 mg/kg) q24hr    Indication for Treatment: Osteomyelitis, SSTI    Pertinent history per medical record: Admitted on 2018 for osteomyelitis of hip. Patient presents with left hip abscess, surgery preformed I&D on 18 removed about 200 mL of purulent fluid . Empiric antibiotics have started.  Infectious disease will be consulted in the morning.     Other antibiotics: Ceftriaxone 2 g IV Q24hr    Allergies: Patient has no known allergies.     List concerns for renal function:    BUN/SCr ratio >20:1   Age   Low albumin    Pertinent cultures to date:   None to date, pending     Recent Labs      18   1830  04/10/18   0303   WBC  23.4*  24.3*   NEUTSPOLYS  83.10*  84.00*     Recent Labs      18   1830   BUN  32*   CREATININE  1.06   ALBUMIN  2.2*     No results for input(s): VANCOTROUGH, VANCOPEAK, VANCORANDOM in the last 72 hours.  Intake/Output Summary (Last 24 hours) at 04/10/18 0332  Last data filed at 18 2225   Gross per 24 hour   Intake              200 ml   Output              235 ml   Net              -35 ml      Blood pressure 114/72, pulse 93, temperature 36.6 °C (97.9 °F), resp. rate 17, height 1.676 m (5' 6\"), weight 59 kg (130 lb), SpO2 94 %. Temp (24hrs), Av.6 °C (97.8 °F), Min:36.2 °C (97.2 °F), Max:37.1 °C (98.8 °F)      A/P   1. Vancomycin dose change: New start  2. Next vancomycin level: Prior to the 2nd dose (not ordered in Epic)  3. Goal trough: 12-16 mcg/mL  4. Comments: Patient has 2/2 concerns for accumulation, listed above. Plan to start at a lower dose of 10 mg/kg Q24hr. Pharmacy to follow, monitor and recommend de-escalation when appropriate.     Yemi Kim, PharmD      "

## 2018-04-10 NOTE — PROGRESS NOTES
Contacted [facility] for clarification of orders regarding ABX as well as the colunga that was undocumented . The seizure precautions , and the plan of care ,     Dr Moses adjusted chart , clarified the orders for desired ABX treatment with Rocephin and Vancomycin    Contacted pharmacy to ensure the dose was not given , and that all med re.c was completed with POC     Pharmacist ordered vanco level to be ordered , administered stat dose of one time vanco, patient tolerating well .

## 2018-04-10 NOTE — PROGRESS NOTES
2 RN skin check complete    RN skin check completed with LEDA Sumner     All areas over bony prominence intact and blanching      No skin tears present   All mucous membranes are intact         No fungal agitations in groin, pannis or breast tissue folds.     No apparent issues present upon admission     Surgical dressing posterior approach left lateral hip , CDI , no drainage

## 2018-04-10 NOTE — ED NOTES
Report given to receiving pre-op RN.   Drawing lab cultures at this time.  All belongings accounted for. Transport at bedside waiting to take pt to surgery.

## 2018-04-10 NOTE — PROGRESS NOTES
"   Orthopaedic Progress Note    Interval changes:  Patient doing well  HV in place with sanguinous exudate- 60cc over last 24 hours  NGTD    ROS - Patient denies any new issues.  Pain well controlled.    Blood pressure 120/65, pulse 86, temperature 36.3 °C (97.4 °F), resp. rate 16, height 1.676 m (5' 6\"), weight 59 kg (130 lb), SpO2 94 %, not currently breastfeeding.      Patient seen and examined  No acute distress  Breathing non labored  RRR  Right hip surgical dressing is clean, dry, and intact. HV in place with sanguinous exudate. Patient clearly fires tibialis anterior, EHL, and gastrocnemius/soleus. Sensation is intact to light touch throughout superficial peroneal, deep peroneal, tibial, saphenous, and sural nerve distributions. Strong and palpable 2+ dorsalis pedis and posterior tibial pulses with capillary refill less than 2 seconds. No lower leg tenderness or discomfort.       Recent Labs      04/09/18   1830  04/10/18   0303   WBC  23.4*  24.3*   RBC  3.30*  3.40*   HEMOGLOBIN  10.0*  10.1*   HEMATOCRIT  30.0*  30.9*   MCV  90.9  90.9   MCH  30.3  29.7   MCHC  33.3*  32.7*   RDW  45.1  45.8   PLATELETCT  427  452*   MPV  9.5  9.2       Active Hospital Problems    Diagnosis   • Closed left hip fracture, sequela [S72.002S]     Priority: High   • Osteomyelitis (CMS-HCC) [M86.9]     Priority: High   • Fall from ground level [W18.30XA]     Priority: Medium   • Debility [R53.81]     Priority: Medium   • Constipation [K59.00]     Priority: Medium   • Anemia [D64.9]     Priority: Medium   • Dementia [F03.90]     Priority: Low   • Hypertension [I10]     Priority: Low   • Pain [R52]       Assessment/Plan:  Doing well post op  POD#1 S/P Left hip irrigation and debridement  Wt bearing status - WBAT  Wound care/Drains - dressing left in place  Future Procedures - none planned   Sutures/Staples out- 10-14 days post operatively  PT/OT-initiated  Antibiotics: rocephin 2g IV QD, vanco 600 mg IV QD  DVT Prophylaxis- " TEDS/SCDs/Foot pumps  Bull-none  Case Coordination for Discharge Planning - Disposition SNF vs home

## 2018-04-10 NOTE — ED TRIAGE NOTES
DANELLE VENTURA from [place] - fell in home 3 weeks ago and has been laying in bed since then. Daughter brought her to [facility] today and found confirmed L femoral head fracture + dislocation +surrounding abscess that has spread to rectum

## 2018-04-10 NOTE — OP REPORT
DATE OF SERVICE:  04/09/2018    PREOPERATIVE DIAGNOSIS:  Septic left hip.    POSTOPERATIVE DIAGNOSIS:  Septic left hip.    PROCEDURE:  Left hip irrigation and debridement.    SURGEON:  Marques Hahn MD    ASSISTANT:  Juan Antonio Javed PA-C.    ESTIMATED BLOOD LOSS:  Minimal.    INDICATIONS:  This is an 87-year-old female who has had a longstanding left   hip arthritic change with near acetabulum who presented with increasing hip   pain.  White count of 23 and an ESR of 104.  Had a CT showing an abscess   involving the left hip and norberto-acetabulum.  Risks and benefits of irrigation   and debridement were discussed, which include but not limited to bleeding,   infection, neurovascular damage, pain, stiffness, and need for the surgery.    They understand all these risks and wished to proceed.    DESCRIPTION OF PROCEDURE:  Patient was sedated with LMA anesthesia and   administered preoperative antibiotics.  Left lower extremity was prepped and   draped in sterile fashion.  A standard posterior approach to the hip was   performed and immediately entering the fascia, about 200 mL of purulent fluid   was encountered.  This was sent for culture and sensitivity.  This fluid   collection was incontinuity with the norberto-acetabulum and the joint surface was   irrigated with copious amounts of normal saline solution and closed over a   drain with 0 PDS, 2-0 Vicryl on nylon sutures.  Sterile dressing was applied.    Patient tolerated the procedure well.    POSTOPERATIVE PLAN:  Patient to be admitted for IV antibiotic therapy and care   per the internal medicine service and consultation by the infectious disease   service for care while awaiting definitive cultures.       ____________________________________     MARQUES HAHN MD    MARY / NTS    DD:  04/09/2018 22:01:21  DT:  04/09/2018 23:30:07    D#:  [id number]  Job#:  [id number]

## 2018-04-10 NOTE — CONSULTS
"4/9/2018    Lizette Means is a 87 y.o. female who presents with a left hip abscess and is here for operative management. Patient denies numbness, parasthesias, loss of concousness or other trauma    History reviewed. No pertinent past medical history.    Past Surgical History:   Procedure Laterality Date   • BREAST BIOPSY      2 right benign biopsies, 1 left benign bx. dates unknown       Medications  No current facility-administered medications on file prior to encounter.      No current outpatient prescriptions on file prior to encounter.       Allergies  Patient has no known allergies.    ROS  Left hip pain. All other systems were reviewed and found to be negative    Family History   Problem Relation Age of Onset   • Cancer Sister      breast       Social History     Social History   • Marital status:      Spouse name: N/A   • Number of children: N/A   • Years of education: N/A     Social History Main Topics   • Smoking status: Former Smoker     Types: Cigarettes   • Smokeless tobacco: Never Used   • Alcohol use No   • Drug use: No   • Sexual activity: Not on file     Other Topics Concern   • Not on file     Social History Narrative   • No narrative on file       Physical Exam  Vitals  Blood pressure 121/55, pulse 87, temperature 37.1 °C (98.8 °F), resp. rate 16, height 1.676 m (5' 6\"), weight 59 kg (130 lb), SpO2 97 %.  General: Well Developed, Well Nourished, no acute distress  HEENT: Normocephalic, atraumatic  Eyes: Anicteric, PERRLA, EOMI  Neck: Supple, nontender, no masses  Lungs: CTA, no wheezes or crackles  Heart: RRR, no murmurs, rubs or gallops  Abdomen: Soft, NT, ND  Pelvis: Stable to AP and Lateral Compression  Skin: Intact, no open wounds  Extremities: Left hip pain and swelling  Neuro: NVI  Vascular: 2+DP/PT, Capillary refill <2 seconds    Radiographs:  Left hip norberto acetabulum  No acute fracture  OUTSIDE IMAGES-DX PELVIS   Final Result      OUTSIDE IMAGES-CT ABDOMEN /PELVIS   Final Result    "   DX-HIP-COMPLETE - UNILATERAL 2+ LEFT    (Results Pending)       Laboratory Values  Recent Labs      04/09/18   1830   WBC  23.4*   RBC  3.30*   HEMOGLOBIN  10.0*   HEMATOCRIT  30.0*   MCV  90.9   MCH  30.3   MCHC  33.3*   RDW  45.1   PLATELETCT  427   MPV  9.5     Recent Labs      04/09/18   1830   SODIUM  135   POTASSIUM  3.7   CHLORIDE  102   CO2  23   GLUCOSE  75   BUN  32*             Impression: Left hip abscess    Plan:Operative intervention. Risks and benefits of surgery were discussed which include but are not limited to bleeding, infection, neurovascular damage, malunion, nonunion, instability, limb length discrepancy, DVT, PE, MI, Stroke and death. They understand these risks and wish to proceed.

## 2018-04-10 NOTE — ED NOTES
Attempted to interview pt . Pt unable to participate  At this time.  Placed call to family (daughter) to discuss current medications and  Last doses taken.    Daughter denies that pt has taken antibiotics in last 30 days.

## 2018-04-10 NOTE — ASSESSMENT & PLAN NOTE
Chronic constipation. May be exacerbated by opiate pain medications.  - continue bowel protocol   - encourage bowel regimen

## 2018-04-10 NOTE — ED PROVIDER NOTES
"ED Provider Note    Scribed for Destin Rios M.D. by Ever Olivares. 4/9/2018, 7:08 PM.    Primary care provider: Geoff Collins M.D.  Means of arrival: ambulance  History obtained from: patient, nursing notes  History limited by: patient is a poor historian    CHIEF COMPLAINT  Chief Complaint   Patient presents with   • Extremity Fracture     DANELLE VENTURA from [place] - fell in home 3 weeks ago and has been laying in bed since then. Daughter brought her to [place] today and found confirmed L femoral head fracture + dislocation +surrounding abscess that has spread to rectum. Ketamine given en route -- only thing to be effective for pain per pt of all the meds given   • Blood Infection       HPI  Lizette Means is a 87 y.o. female who presents to the Emergency Department as a transfer from [facility] for evaluation of rectal pain. Per patient, she has been experiencing rectal pain for the last several months, but it has been constant and worsening for the 2 weeks. She describes her pain as \"sharp\". Passing stool worsens her pain. The patient endorses associated nausea, constipation, bloody stools and decreased appetite. She states it is painful to pass stool, and she sees a small amount of bright red blood in the toilet with every bowel movement. She notes a history of hemorrhoids, which may be the reason why she has bloody stools. The patient confirms she has had a colonoscopy in the past, which found benign polyps but were otherwise normal. She does not note any exacerbating or alleviating factors. The patient confirms a history of rheumatoid arthiritis, but she does not take any prescription medications. She states she usually takes Aleve, but it has not provided any relief to her rectal pain. She denies fevers, shortness of breath, chest pain, vomiting, or diarrhea.     The patient adds she had a ground level fall 3 weeks ago, landing on her left side. Nursing staff reports for the past month, the patient has been " "experiencing multiple falls. She states she has been laying in bed since. She states initially, she did not have any significant pain to her left side. However, two weeks ago the patient began experiencing left leg pain that has been constant and worsening since. Per Nursing Staff, the patient was seen at [facility] today, where she had imaging studies that confirmed a left femoral head fracture with dislocation and a surrounding abscess that has spread to her rectum. She was also noted to be septic at the sending facility. Patient states she was given medication by EMS en route to the ED, which improved her pain. Nursing notes report the patient was given Ketamine en route to the ED.    History is somewhat limited because the patient is a poor historian.      REVIEW OF SYSTEMS  Pertinent positives include rectal pain, nausea, constipation, bloody stools, decreased appetite, multiple falls, left leg pain. Pertinent negatives include no fevers, shortness of breath, chest pain, vomiting, diarrhea.   See HPI for further details. C.    ROS is somewhat limited because the patient is a poor historian.      PAST MEDICAL HISTORY   Rheumatoid arthritis, hemorrhoids.     SURGICAL HISTORY   has a past surgical history that includes breast biopsy.    SOCIAL HISTORY  Social History   Substance Use Topics   • Smoking status: Former Smoker     Types: Cigarettes   • Smokeless tobacco: Never Used   • Alcohol use No      History   Drug Use No       FAMILY HISTORY  Family History   Problem Relation Age of Onset   • Cancer Sister      breast       CURRENT MEDICATIONS  Home Medications     Reviewed by Yadira Springer R.N. (Registered Nurse) on 04/09/18 at 1502  Med List Status: Partial   Medication Last Dose Status        Patient Destin Taking any Medications                       ALLERGIES  No Known Allergies    PHYSICAL EXAM  VITAL SIGNS: /90   Pulse 84   Temp 36.2 °C (97.2 °F)   Resp 18   Ht 1.676 m (5' 6\")   Wt 59 " kg (130 lb)   SpO2 98%   BMI 20.98 kg/m²   Vitals reviewed.  Constitutional: Thin, elderly, frail female lying in bed. Alert in no apparent distress.  HENT: No signs of trauma, Bilateral external ears normal, Nose normal.   Eyes: Pupils are equal and reactive, Conjunctiva normal, Non-icteric.   Neck: Normal range of motion, No tenderness, Supple, No stridor.   Lymphatic: No lymphadenopathy noted.   Cardiovascular: Regular rate and rhythm, no murmurs.   Thorax & Lungs: Normal breath sounds, No respiratory distress, No wheezing, No chest tenderness.   Abdomen: Bowel sounds normal, Soft, No tenderness, No peritoneal signs, No masses, No pulsatile masses.   Skin: Warm, Dry, No erythema, No rash.   Back: No bony tenderness   Extremities: Intact distal pulses, No cyanosis  Musculoskeletal: Decreased ROM of left hip. There is tenderness over left hip and left proximal femur with proximal left femur deformity.  Neurologic: Alert , Normal motor function, Normal sensory function - full sensation over LLE.  Psychiatric: Affect normal, Judgment normal, Mood normal.     DIAGNOSTIC STUDIES / PROCEDURES    LABS  Labs Reviewed   CBC WITH DIFFERENTIAL   COMP METABOLIC PANEL   CRP QUANTITIVE (NON-CARDIAC)   WESTERGREN SED RATE   BLOOD CULTURE   BLOOD CULTURE      All labs reviewed by me.    EKG Interpretation:  Interpreted by me    12 Lead EKG interpreted by me to show:  Normal sinus rhythm  Rate 82  Axis: Normal  Intervals: Normal  Isolated T wave inversion in lead aVR. T wave flattening in V1.   Anterior Q waves.   Normal ST segments  My impression of this EKG: No STEMI. Does not indicate ischemia or arrhythmia at this time.    RADIOLOGY  DX-HIP-COMPLETE - UNILATERAL 2+ LEFT    (Results Pending)     The radiologist's interpretation of all radiological studies have been reviewed by me.    COURSE & MEDICAL DECISION MAKING  Nursing notes, VS, PMSFHx reviewed in chart.     Differential diagnoses include but not limited to: cancer,  abscess, fracture     7:08 PM Patient seen and examined at bedside. Patient arrives afebrile with normal vital signs. Patient appears well hydrated and non-toxic.  Ordered for hip x-ray, blood culture x2, CBC with differential, CMP, CRP, westergren sed rate, EKG to evaluate. Patient will be treated with continuous NS infusion for her symptoms. The patient is treated with IV fluids secondary to concerns for sepsis and due to her NPO status for likely OR washout.     7:29 PM - Obtained and reviewed past medical records from [facility] which indicated the following: The patient was given Rocephin prior to transfer.     7:32 PM - Paged Ortho    7:45 PM - I discussed the patient's case and the above findings with Dr. Patel (Ortho) who will take the patient to the OR.    Labs reveal significant leukocytosis, elevated CRP. Patient made NPO. Transferred to OR for washout.    7:56 PM - Paged [facility]    8:09 PM - I discussed the patient's case and the above findings with [facility] who agree to admit the patient.       DISPOSITION:  Patient will be admitted to [facility] in guarded condition.     FINAL IMPRESSION  1. Closed fracture of left hip, initial encounter (CMS-[facility])    2. Abscess    3. Septic hip (CMS-[facility])          Ever GARCIA (Scrdavid), am scribing for, and in the presence of, Destin Rios M.D..    Electronically signed by: Ever Olivares (Paolo), 4/9/2018    Destin GARCIA M.D. personally performed the services described in this documentation, as scribed by Ever Olivares in my presence, and it is both accurate and complete.    The note accurately reflects work and decisions made by me.  Detsin Rios  4/10/2018  3:29 AM

## 2018-04-10 NOTE — CARE PLAN
Problem: Nutritional:  Goal: Achieve adequate nutritional intake  Patient will consume 50% of meals  Outcome: PROGRESSING SLOWER THAN EXPECTED  PO 25-50% for one meal.

## 2018-04-10 NOTE — ASSESSMENT & PLAN NOTE
H/H 10/30 on admit and stable. History of Hemorrhoids. Bright red blood in the toilet with every bowel movement, no black stool. Pt had colonoscopy in the past which showed benign polyps. FOBT moderate, suspect chronic blood loss component from GI bleed vs perirectal abscess. Not acute blood loss due to surgery, as H/H stable since preop. Iron deficient, % saturation low, IV iron replacement started on 4/10. S/p IV iron.  - daily CBC   - Will transfuse if Hb < 7  - consider PPI, GI consult if decreasing H/H or persistent FOBT positive after wound controlled

## 2018-04-10 NOTE — CARE PLAN
Problem: Safety  Goal: Will remain free from injury  Outcome: PROGRESSING AS EXPECTED  Treaded socks in place, bed in the lowest position, bed alarm on, call light and belongings within reach, pt call for assistance appropriately    Problem: Pain Management  Goal: Pain level will decrease to patient's comfort goal  Outcome: PROGRESSING AS EXPECTED  Pt. Taking oxy 10mg with adequate pain control.

## 2018-04-10 NOTE — PROGRESS NOTES
IV Iron Per Pharmacy Note    Patient Lean Body Weight:  60 kg  Reason for Iron Replacement: Acute blood loss    Lab Results   Component Value Date/Time    WBC 24.3 (H) 04/10/2018 03:03 AM    RBC 3.40 (L) 04/10/2018 03:03 AM    HEMOGLOBIN 10.1 (L) 04/10/2018 03:03 AM    HEMATOCRIT 30.9 (L) 04/10/2018 03:03 AM    MCV 90.9 04/10/2018 03:03 AM    MCH 29.7 04/10/2018 03:03 AM    MCHC 32.7 (L) 04/10/2018 03:03 AM    MPV 9.2 04/10/2018 03:03 AM       Recent Labs      04/10/18   0303   FERRITIN  365.4*   FOLATE  >24.0   IRON  17*         Recent Labs      04/10/18   0303   CREATININE  0.95          Assessment/Plan:  1. IV Iron Indicated.   2. Give Iron Sucrose 200mg IV daily x 5 days.     Christina Vance, PharmD

## 2018-04-10 NOTE — ASSESSMENT & PLAN NOTE
Multiple ground level falls for the last month & 3 weeks ago, she had a ground level fall & landed on her left side  Pt unable to lie flat for MRI pelvis. Not a candidate for BZDP  Repeat HUONG today - no changes, FIT weakly positive, no pus / swelling / exquisite tenderess  - continue Vitamin D started   - PT/OT   - Discharge to SNF tomorrow  - will need 6 weeks of IV antibiotics in total per Dr. Fraire, needs Ertapenem daily at SNF

## 2018-04-10 NOTE — CARE PLAN
Problem: Infection  Goal: Will remain free from infection    Intervention: Assess signs and symptoms of infection  Assess for color changes at wound site , temperature difference , increased pain , increased drainage. Monitor labs for any indication of trending infections.ALL WDL // LLE

## 2018-04-10 NOTE — CODE DOCUMENTATION
Patient complaining of chest pain to bedside RN. Upon arrival of RRT, patient states she thinks the pain she felt was heartburn and she has felt it before at home. Patient states she does not have any chest pain at this time.

## 2018-04-10 NOTE — PROGRESS NOTES
"Pharmacy Kinetics 87 y.o. female on vancomycin day # 2 4/10/2018    Currently on Vancomycin 600mg iv q24hr (1500)    Indication for Treatment: Osteomyelitis, SSTI     Pertinent history per medical record: Admitted on 2018 for osteomyelitis of hip. Patient presents with left hip abscess, surgery preformed I&D on 18 removed about 200 mL of purulent fluid . Empiric antibiotics have started.  Infectious disease will be consulted in the morning.     Other antibiotics: Ceftriaxone     Allergies: Patient has no known allergies.     List concerns for renal function: BUN/SCr ratio > 20:1, Hypoalbuminemia, Age     Pertinent cultures to date:   18: Wound Cx = In process   18: Anaerobic Cx = In process     Recent Labs      18   1830  04/10/18   0303   WBC  23.4*  24.3*   NEUTSPOLYS  83.10*  84.00*     Recent Labs      18   1830  04/10/18   0303   BUN  32*  30*   CREATININE  1.06  0.95   ALBUMIN  2.2*  2.1*     No results for input(s): VANCOTROUGH, VANCOPEAK, VANCORANDOM in the last 72 hours.  Intake/Output Summary (Last 24 hours) at 04/10/18 0858  Last data filed at 04/10/18 0500   Gross per 24 hour   Intake              420 ml   Output              645 ml   Net             -225 ml      Blood pressure 102/61, pulse (!) 111, temperature 36.4 °C (97.6 °F), resp. rate 16, height 1.676 m (5' 6\"), weight 59 kg (130 lb), SpO2 96 %, not currently breastfeeding. Temp (24hrs), Av.6 °C (97.9 °F), Min:36.2 °C (97.2 °F), Max:37.1 °C (98.8 °F)      A/P   1. Vancomycin dose change: Not indicated.   2. Next vancomycin level: Tomorrow,  at 1430   3. Goal trough: 12 - 16 mcg/mL   4. Comments: Patient received loading dose 25mg per Kg of vancomycin 18 at ~ 0300. Maintenance dosing 10mg per kg every 24 hours to begin this afternoon at 1500, ~ 12 hours post loading dosing. First trough level ordered for tomorrow afternoon, prior to 3rd total dose of vancomycin, prior to drug achieving steady state " concentrations to preliminarily assess pharmacokinetics and appropriateness of dosing regimen. Multiple concerns for nephrotoxicity listed above, although renal indices improved today. Minimal concerns for accumulation as vancomycin therapy continues, provided renal function remains adequate - anticipate a low to normal volume of distribution of vancomycin. Cultures are pending. Clinical pharmacist to follow daily and adjust dosing as indicated according to trough level goal as outlined above.     Christina Vance, PharmD

## 2018-04-10 NOTE — PROGRESS NOTES
Secondary EKG states to consider the venkatesh septal infarct as well as the first EKG , however the rapid team does not feel this needs to be escalated . Patient states she feels like the chest pain was an episode of heart burn that has now resolved , will pass onto the day shift RN to order calcium carbonate PRN for heart burn .

## 2018-04-10 NOTE — H&P
Internal Medicine Admitting History and Physical    Note Author: Carrol Moses M.D.       Name Lizette Means     3/8/1931   Age/Sex 87 y.o. female   MRN [id number]   Code Status DNR      After 5PM or if no immediate response to page, please call for cross-coverage  Attending/Team:   See Patient List for primary contact information  Call [phone] to page    1st Call - Day Intern (R1):    2nd Call - Day Sr. Resident (R2/R3):           Chief Complaint:  Left Hip Pain & Rectal Pain     HPI:  Pt is an 86 yo lady with PMHx of Dementia , Hypertension ( on lisinopril & Chlorthalidone ), Rheumatoid Arthritis, Hemorrhoids transferred from [facility] for Left femoral head fracture & dislocation associated with surrounding abscess that has spread to rectum which were found on  Imaging study. Pt is in pain but can provide some part of  the history. History was limited since the pt was taken to OR during the interview & also pt is a poor historian due to baseline dementia.   Pt had multiple ground level falls for the last month & 3 weeks ago, she had a ground level fall & landed on her left side. She denied no head injury at that aisha. After that she started to notice left hip pain & she became bed-bound because of the pain. Per pt, she had been laying in bed since that fall. Her left hip pain was mild 2-3/10 when it started after her fall 3 weeks ago but for the last 2 weeks, her left hip pain was getting worse, 9/10, constant dull pain, no radiation,aggravated by movements & somewhat relieved by Rest & medications (Tylenol & Tramadol ). Pt also complaints of rectal pain on & off which she relates the pain to chronic constipation & Hemorrhoids . But it has been constant & more severe x 2 weeks. Rectal pain is sharp, constant, aggravated by bowel movements and associated with nausea, constipation, bloody stools, decreased appetite & poor oral intake. Pt noticed a small amount of bright  "red blood in the toilet with every bowel movement, no black stool. Pt had colonoscopy in the past which showed benign polyps.  Pt denied fever, chills, chest pain, SOB, vomiting, diarrhea, cough, runny nose, dysuria , urinary frequency & urgency, changes in her mentation, syncope, LOC.      At [facility], pt was found to be septic & was given IVF & C3 1 g. Imaging study showed Left femoral head fracture & dislocation associated with surrounding abscess that has spread to rectum. So the pt ws transferred to [facility] ED.      At ED, Vitals: /55   Pulse 87   Temp 37.1 °C (98.8 °F)   Resp 16   Ht 1.676 m (5' 6\")   Wt 59 kg (130 lb)   SpO2 97%   BMI 20.98 kg/m²    SIRS 1/4 by WBC 23.4. qSOFA 0.   H/H 10/30.   K+ 3.7.   BUN 32, Cr 1.06, eGFR 49.   EKG showed Normal sinus rhythm with HR of 82.  Blood Cx, Urine Cx & Urinalysis ordered.   ED physician consulted Dr. Patel (Ortho) & pt was taken to OR right away.   Admitted to Ortho Floor. Started on C3 & Vanco for possible osteomyelitis & skin/soft tissue abscess. Pt did not have any hardware in the effected area, Rifampin was held for now.   ID consult in AM.        Review of Systems   Constitutional: Positive for malaise/fatigue. Negative for chills and fever.   HENT: Negative for congestion, sinus pain and sore throat.    Eyes: Negative for blurred vision and double vision.   Respiratory: Negative for cough, sputum production and shortness of breath.    Cardiovascular: Negative for chest pain, palpitations, orthopnea, leg swelling and PND.   Gastrointestinal: Positive for blood in stool, constipation and nausea. Negative for abdominal pain, diarrhea, melena and vomiting.   Genitourinary: Negative for dysuria, frequency and urgency.   Musculoskeletal: Positive for falls and joint pain.   Neurological: Positive for weakness. Negative for dizziness, sensory change, speech change, focal weakness, seizures, loss of consciousness and headaches.   "   Psychiatric/Behavioral: Positive for memory loss. Negative for depression. The patient is not nervous/anxious.              Past Medical History:   History reviewed. No pertinent past medical history.    Past Surgical History:  Past Surgical History:   Procedure Laterality Date   • BREAST BIOPSY      2 right benign biopsies, 1 left benign bx. dates unknown       Current Outpatient Medications:  Home Medications     Reviewed by Lesly Arredondo R.N. (Registered Nurse) on 04/09/18 at 2059  Med List Status: Complete   Medication Last Dose Status   acetaminophen (TYLENOL) tablet 650 mg  Active   amitriptyline (ELAVIL) 25 MG Tab 4/8/2018 Active   bisacodyl (DULCOLAX) suppository 10 mg  Active   cefTRIAXone (ROCEPHIN) syringe 2 g  Active   chlorthalidone (HYGROTON) 25 MG Tab 4/9/2018 Active   lisinopril (PRINIVIL) 10 MG Tab 4/9/2018 Active   magnesium hydroxide (MILK OF MAGNESIA) suspension 30 mL  Active   MD ALERT... vancomycin per pharmacy protocol  Active   meclizine (ANTIVERT) 25 MG Tab 4/9/2018 Active   naproxen (ALEVE) 220 MG tablet 4/9/2018 Active   polyethylene glycol/lytes (MIRALAX) PACKET 1 Packet  Active   potassium chloride SA (KDUR) 20 MEQ Tab CR 4/9/2018 Active   senna-docusate (PERICOLACE or SENOKOT S) 8.6-50 MG per tablet 2 Tab  Active   vitamin D (cholecalciferol) tablet 1,000 Units  Active                Medication Allergy/Sensitivities:  No Known Allergies      Family History:  Family History   Problem Relation Age of Onset   • Cancer Sister      breast       Social History:  Social History     Social History   • Marital status:      Spouse name: N/A   • Number of children: N/A   • Years of education: N/A     Occupational History   • Not on file.     Social History Main Topics   • Smoking status: Former Smoker     Types: Cigarettes   • Smokeless tobacco: Never Used   • Alcohol use No   • Drug use: No   • Sexual activity: Not on file     Other Topics Concern   • Not on file     Social History  "Narrative   • No narrative on file     Living situation: [place]   PCP : [place]       Physical Exam     Vitals:    04/10/18 0015 04/10/18 0030 04/10/18 0045 04/10/18 0100   BP:    114/72   Pulse: 79 80 81 93   Resp: 16 18 19 17   Temp:    36.6 °C (97.9 °F)   SpO2: 100% 100% 98% 94%   Weight:       Height:         Body mass index is 20.98 kg/m².  /72   Pulse 93   Temp 36.6 °C (97.9 °F)   Resp 17   Ht 1.676 m (5' 6\")   Wt 59 kg (130 lb)   SpO2 94%   BMI 20.98 kg/m²   O2 therapy: Pulse Oximetry: 94 %, O2 (LPM): 0, O2 Delivery: None (Room Air)    Physical Exam   Constitutional: She is well-developed, well-nourished, and in no distress. No distress.   HENT:   Head: Normocephalic and atraumatic.   Eyes: Conjunctivae are normal. Pupils are equal, round, and reactive to light.   Neck: Normal range of motion. Neck supple.   Cardiovascular: Normal rate, regular rhythm and normal heart sounds.    No murmur heard.  Pulmonary/Chest: Effort normal and breath sounds normal. No respiratory distress.   Abdominal: Soft. Bowel sounds are normal. She exhibits no distension.   Musculoskeletal: She exhibits tenderness. She exhibits no edema.   Left Hip Joint Tenderness on Palpation.    Neurological: She is alert.   Skin: Skin is warm.   Psychiatric: Affect normal.             Data Review       Old Records Request:   Completed  Current Records review and summary: Completed    Lab Data Review:  Recent Results (from the past 24 hour(s))   CBC WITH DIFFERENTIAL    Collection Time: 04/09/18  6:30 PM   Result Value Ref Range    WBC 23.4 (H) 4.8 - 10.8 K/uL    RBC 3.30 (L) 4.20 - 5.40 M/uL    Hemoglobin 10.0 (L) 12.0 - 16.0 g/dL    Hematocrit 30.0 (L) 37.0 - 47.0 %    MCV 90.9 81.4 - 97.8 fL    MCH 30.3 27.0 - 33.0 pg    MCHC 33.3 (L) 33.6 - 35.0 g/dL    RDW 45.1 35.9 - 50.0 fL    Platelet Count 427 164 - 446 K/uL    MPV 9.5 9.0 - 12.9 fL    Neutrophils-Polys 83.10 (H) 44.00 - 72.00 %    Lymphocytes 10.20 (L) 22.00 - " 41.00 %    Monocytes 4.70 0.00 - 13.40 %    Eosinophils 0.40 0.00 - 6.90 %    Basophils 0.30 0.00 - 1.80 %    Immature Granulocytes 1.30 (H) 0.00 - 0.90 %    Nucleated RBC 0.00 /100 WBC    Neutrophils (Absolute) 19.41 (H) 2.00 - 7.15 K/uL    Lymphs (Absolute) 2.38 1.00 - 4.80 K/uL    Monos (Absolute) 1.11 (H) 0.00 - 0.85 K/uL    Eos (Absolute) 0.10 0.00 - 0.51 K/uL    Baso (Absolute) 0.06 0.00 - 0.12 K/uL    Immature Granulocytes (abs) 0.31 (H) 0.00 - 0.11 K/uL    NRBC (Absolute) 0.00 K/uL   COMP METABOLIC PANEL    Collection Time: 18  6:30 PM   Result Value Ref Range    Sodium 135 135 - 145 mmol/L    Potassium 3.7 3.6 - 5.5 mmol/L    Chloride 102 96 - 112 mmol/L    Co2 23 20 - 33 mmol/L    Anion Gap 10.0 0.0 - 11.9    Glucose 75 65 - 99 mg/dL    Bun 32 (H) 8 - 22 mg/dL    Creatinine 1.06 0.50 - 1.40 mg/dL    Calcium 7.9 (L) 8.5 - 10.5 mg/dL    AST(SGOT) 17 12 - 45 U/L    ALT(SGPT) 11 2 - 50 U/L    Alkaline Phosphatase 82 30 - 99 U/L    Total Bilirubin 0.4 0.1 - 1.5 mg/dL    Albumin 2.2 (L) 3.2 - 4.9 g/dL    Total Protein 5.6 (L) 6.0 - 8.2 g/dL    Globulin 3.4 1.9 - 3.5 g/dL    A-G Ratio 0.6 g/dL   CRP QUANTITIVE (NON-CARDIAC)    Collection Time: 18  6:30 PM   Result Value Ref Range    Stat C-Reactive Protein 26.47 (H) 0.00 - 0.75 mg/dL   WESTERGREN SED RATE    Collection Time: 18  6:30 PM   Result Value Ref Range    Sed Rate Westergren 104 (H) 0 - 30 mm/hour   ESTIMATED GFR    Collection Time: 18  6:30 PM   Result Value Ref Range    GFR If  59 (A) >60 mL/min/1.73 m 2    GFR If Non African American 49 (A) >60 mL/min/1.73 m 2   EKG (ER)    Collection Time: 18  7:49 PM   Result Value Ref Range    Report       [facility]    Test Date:  2018  Pt Name:    ALEC WALKER                   Department: ER  MRN:        [id number]                      Room:       [facility]  Gender:     Female                       Technician: [id number]  :         [id number]                   Requested By:LANDRY DELGADO  Order #:    [id number]                    Reading MD:    Measurements  Intervals                                Axis  Rate:       82                           P:          61  MN:         136                          QRS:        47  QRSD:       86                           T:          53  QT:         380  QTc:        444    Interpretive Statements  SINUS RHYTHM  CONSIDER LEFT ATRIAL ABNORMALITY  ANTERIOR INFARCT, OLD  No previous ECG available for comparison     EKG    Collection Time: 04/10/18  2:50 AM   Result Value Ref Range    Report       [facility]    Test Date:  2018-04-10  Pt Name:    ALEC WALKER                   Department: [facility]  MRN:        [id number]                      Room:       10  Gender:     Female                       Technician: [id number]  :        [id number]                   Requested By:GERMAINE NICHOLE  Order #:    [id number]                    Reading MD:    Measurements  Intervals                                Axis  Rate:       90                           P:          69  MN:         138                          QRS:        55  QRSD:       70                           T:          56  QT:         356  QTc:        436    Interpretive Statements  SINUS RHYTHM  MULTIPLE ATRIAL PREMATURE COMPLEXES  CONSIDER ANTEROSEPTAL INFARCT  BASELINE WANDER IN LEAD(S) V6  Compared to ECG 2018 19:49:25  Atrial premature complex(es) now present  Myocardial infarct finding still present     CBC WITH DIFFERENTIAL    Collection Time: 04/10/18  3:03 AM   Result Value Ref Range    WBC 24.3 (H) 4.8 - 10.8 K/uL    RBC 3.40 (L) 4.20 - 5.40 M/uL    Hemoglobin 10.1 (L) 12.0 - 16.0 g/dL    Hematocrit 30.9 (L) 37.0 - 47.0 %    MCV 90.9 81.4 - 97.8 fL    MCH 29.7 27.0 - 33.0 pg    MCHC 32.7 (L) 33.6 - 35.0 g/dL    RDW 45.8 35.9 - 50.0 fL    Platelet Count 452 (H) 164 - 446 K/uL    MPV 9.2 9.0 - 12.9 fL    Neutrophils-Polys 84.00 (H) 44.00 - 72.00 %    Lymphocytes  10.40 (L) 22.00 - 41.00 %    Monocytes 3.80 0.00 - 13.40 %    Eosinophils 0.10 0.00 - 6.90 %    Basophils 0.40 0.00 - 1.80 %    Immature Granulocytes 1.30 (H) 0.00 - 0.90 %    Nucleated RBC 0.00 /100 WBC    Neutrophils (Absolute) 20.45 (H) 2.00 - 7.15 K/uL    Lymphs (Absolute) 2.52 1.00 - 4.80 K/uL    Monos (Absolute) 0.92 (H) 0.00 - 0.85 K/uL    Eos (Absolute) 0.03 0.00 - 0.51 K/uL    Baso (Absolute) 0.10 0.00 - 0.12 K/uL    Immature Granulocytes (abs) 0.32 (H) 0.00 - 0.11 K/uL    NRBC (Absolute) 0.00 K/uL       Imaging/Procedures Review:    ndependant Imaging Review: Completed  DX-HIP-COMPLETE - UNILATERAL 2+ LEFT   Final Result         1. Interval postsurgical change in the left lateral hip with placement of the surgical drain.      OUTSIDE IMAGES-DX PELVIS   Final Result      OUTSIDE IMAGES-CT ABDOMEN /PELVIS   Final Result               EKG:   EKG Independant Review: Completed  QTc:444, HR: 82, Normal Sinus Rhythm         Assessment/Plan     * Closed left hip fracture, sequela   Assessment & Plan    -  transferred from [facility] for Left femoral head fracture & dislocation associated with surrounding abscess that has spread to rectum which were found on  Imaging study   - GLF 3 days ago & landed on left side   Plan:   - Admitted to Ortho   -ED physician consulted Dr. Patel (Ortho) & pt was taken to OR right away.   - Fall / seizure / aspiration precautions   - on Vanco & C3  - Pt did not have any hardware in the effected area, Rifampin was held for now.   - pain control by ortho   -ID consult in AM  - vitamin D started             Osteomyelitis (CMS-HCC)   Assessment & Plan    - no MRI   - will wait for OR report to confirm osteomyelitis   -  transferred from [facility] for Left femoral head fracture & dislocation associated with surrounding abscess that has spread to rectum which were found on  Imaging study   - GLF 3 days ago & landed on left side   Plan:   - Admitted to Ortho   -ED  physician consulted Dr. Patel (Ortho) & pt was taken to OR right away.   - Fall / seizure / aspiration precautions   - on Vanco & C3  - Pt did not have any hardware in the effected area, Rifampin was held for now.   - pain control by ortho   -ID consult in AM  - vitamin D started               Anemia   Assessment & Plan    - H/H 10/30 on admit   - History of Hemorrhoids  - Pt noticed a small amount of bright red blood in the toilet with every bowel movement, no black stool   - Pt had colonoscopy in the past which showed benign polyps  Plan:  - Admitted to Ortho  - will monitor with daily CBC   - Will transfuse if Hb < 7  - ordered Ferritin, Iron panel, B12 , Folate   - FOBT ordered           Constipation   Assessment & Plan    - chronic constipation   - on bowel protocol   - pt will be on opoid pain meds after Sx , needs an effective bowel regimen           Debility   Assessment & Plan    - 86 yo lady admitted for  Left femoral head fracture & dislocation associated with surrounding abscess that has spread to rectum   - GLF 3 weeks ago & became bed bound   - baseline pt used wheelchair to ambulate  - vitamin D started   - pressure ulcer prevention protocol   - PT / OT if the pt can tolerate           Fall from ground level   Assessment & Plan    - 86 yo lady admitted for  Left femoral head fracture & dislocation associated with surrounding abscess that has spread to rectum   -multiple ground level falls for the last month & 3 weeks ago, she had a ground level fall & landed on her left side  - Vitamin D started   - PT  / OT if the pt can tolerate        Pain   Assessment & Plan    - pain after ortho Sx  - pain control by Sx   - pt is on ketorolac scheduled & oxy 5 mg / 10 mg , Dilaudid prn           Hypertension   Assessment & Plan    - on lisinopril & Chlorthiazide at home  - will hold BP medication in the setting of sepsis         Dementia   Assessment & Plan    - chronic   - will CTM               Anticipated  Hospital stay:  >2 midnights        Quality Measures  Quality-Core Measures   Reviewed items::  EKG reviewed, Labs reviewed, Medications reviewed and Radiology images reviewed  Bull catheter::  No Bull  DVT prophylaxis pharmacological::  Contraindicated - Anemia requiring blood transfusion  DVT prophylaxis - mechanical:  SCDs  Antibiotics:  Treating active infection/contamination beyond 24 hours perioperative coverage  Assessed for rehabilitation services:  Patient was assess for and/or received rehabilitation services during this hospitalization

## 2018-04-10 NOTE — ASSESSMENT & PLAN NOTE
On lisinopril & chlorthalidone at home. Normotensive this admission.  - held antihypertensives for BP support in the setting of sepsis

## 2018-04-10 NOTE — ASSESSMENT & PLAN NOTE
GLF 3 days prior to admission, landed on left side. S/p I&D left hip on 4/9/18. No implants in left hip. Gram stain w some WBCs, likely gram -ve diplo, likely moraxella per ID, Dr. Fraire. Cultures NGTD. Vancomycin, ceftriaxone (04/09-04/12) changed to Unasyn (04/12-->)  WBC down to 15.7  - continue Unasyn, convert to ertapenem at discharge to [facility] per Dr. Fraire  - continue pain control with scheduled acetaminophen, prn oxycodone  - ortho on board  - supplement vitamin D   - oxycodone 5 mg PRN-8hrly for pain short course  - continue Lovenox for DVT ppx  - PT, OT   - Discharge to SNF tomorrow  - will need 6 weeks of IV antibiotics in total per Dr. Fraire, needs Ertapenem daily at [facility]

## 2018-04-10 NOTE — PROGRESS NOTES
S/P I&D hip  Awaiting culture results to see if infection present or just inflammatory fluid collection  Will need ID consult if infectious

## 2018-04-10 NOTE — DIETARY
"Nutrition services: Day 1 of admit.  Lizette Means is an 87 y.o. female with admitting DX of Osteomyelitis hip.    Consult received for Poor PO, unplanned wt loss.  Attempted to speak with pt at bedside, but pt was sleeping and did not rouse to name.  Per ADL flow sheet, PO for lunch today was 25-50%.  No wt hx in chart to assess wt loss.  RD will monitor for adequate PO intake.    Assessment:  Height: 167.6 cm (5' 6\")  Weight: 59 kg (130 lb)  Body mass index is 20.98 kg/m².  Diet/Intake: Regular; PO 25-50%    Recommendations/Plan:  1. Encourage intake of meals.  2. Document intake of all meals as % taken in ADL's to provide interdisciplinary communication across all shifts.   3. Monitor weight.  4. Nutrition rep will continue to see patient for ongoing meal and snack preferences.   5. Obtain supplement order per RD as needed.            "

## 2018-04-10 NOTE — PROGRESS NOTES
Patient complained of sharp chest pain / called a rapid response     Hospitalist informed earlier of EKG results via telephone Dr Moses //

## 2018-04-11 PROBLEM — D50.0 IRON DEFICIENCY ANEMIA DUE TO CHRONIC BLOOD LOSS: Status: ACTIVE | Noted: 2018-04-09

## 2018-04-11 PROBLEM — A41.9 SEPSIS (HCC): Status: ACTIVE | Noted: 2018-04-11

## 2018-04-11 LAB
ALBUMIN SERPL BCP-MCNC: 2.3 G/DL (ref 3.2–4.9)
ALBUMIN/GLOB SERPL: 0.7 G/DL
ALP SERPL-CCNC: 102 U/L (ref 30–99)
ALT SERPL-CCNC: 13 U/L (ref 2–50)
ANION GAP SERPL CALC-SCNC: 8 MMOL/L (ref 0–11.9)
AST SERPL-CCNC: 23 U/L (ref 12–45)
BASOPHILS # BLD AUTO: 0.3 % (ref 0–1.8)
BASOPHILS # BLD: 0.07 K/UL (ref 0–0.12)
BILIRUB SERPL-MCNC: 0.4 MG/DL (ref 0.1–1.5)
BUN SERPL-MCNC: 26 MG/DL (ref 8–22)
CALCIUM SERPL-MCNC: 8.1 MG/DL (ref 8.5–10.5)
CHLORIDE SERPL-SCNC: 102 MMOL/L (ref 96–112)
CO2 SERPL-SCNC: 27 MMOL/L (ref 20–33)
CREAT SERPL-MCNC: 0.85 MG/DL (ref 0.5–1.4)
EKG IMPRESSION: NORMAL
EOSINOPHIL # BLD AUTO: 0.13 K/UL (ref 0–0.51)
EOSINOPHIL NFR BLD: 0.6 % (ref 0–6.9)
ERYTHROCYTE [DISTWIDTH] IN BLOOD BY AUTOMATED COUNT: 44.7 FL (ref 35.9–50)
GLOBULIN SER CALC-MCNC: 3.4 G/DL (ref 1.9–3.5)
GLUCOSE SERPL-MCNC: 100 MG/DL (ref 65–99)
GRAM STN SPEC: NORMAL
HCT VFR BLD AUTO: 30.7 % (ref 37–47)
HGB BLD-MCNC: 10 G/DL (ref 12–16)
IMM GRANULOCYTES # BLD AUTO: 0.32 K/UL (ref 0–0.11)
IMM GRANULOCYTES NFR BLD AUTO: 1.4 % (ref 0–0.9)
LYMPHOCYTES # BLD AUTO: 2.28 K/UL (ref 1–4.8)
LYMPHOCYTES NFR BLD: 9.9 % (ref 22–41)
MAGNESIUM SERPL-MCNC: 2.1 MG/DL (ref 1.5–2.5)
MCH RBC QN AUTO: 29.4 PG (ref 27–33)
MCHC RBC AUTO-ENTMCNC: 32.6 G/DL (ref 33.6–35)
MCV RBC AUTO: 90.3 FL (ref 81.4–97.8)
MONOCYTES # BLD AUTO: 1 K/UL (ref 0–0.85)
MONOCYTES NFR BLD AUTO: 4.4 % (ref 0–13.4)
NEUTROPHILS # BLD AUTO: 19.13 K/UL (ref 2–7.15)
NEUTROPHILS NFR BLD: 83.4 % (ref 44–72)
NRBC # BLD AUTO: 0 K/UL
NRBC BLD-RTO: 0 /100 WBC
PHOSPHATE SERPL-MCNC: 2.3 MG/DL (ref 2.5–4.5)
PLATELET # BLD AUTO: 468 K/UL (ref 164–446)
PMV BLD AUTO: 8.8 FL (ref 9–12.9)
POTASSIUM SERPL-SCNC: 3.5 MMOL/L (ref 3.6–5.5)
PROT SERPL-MCNC: 5.7 G/DL (ref 6–8.2)
RBC # BLD AUTO: 3.4 M/UL (ref 4.2–5.4)
SIGNIFICANT IND 70042: NORMAL
SITE SITE: NORMAL
SODIUM SERPL-SCNC: 137 MMOL/L (ref 135–145)
SOURCE SOURCE: NORMAL
VANCOMYCIN TROUGH SERPL-MCNC: 12.8 UG/ML (ref 10–20)
WBC # BLD AUTO: 22.9 K/UL (ref 4.8–10.8)

## 2018-04-11 PROCEDURE — 85025 COMPLETE CBC W/AUTO DIFF WBC: CPT

## 2018-04-11 PROCEDURE — 93005 ELECTROCARDIOGRAM TRACING: CPT | Performed by: STUDENT IN AN ORGANIZED HEALTH CARE EDUCATION/TRAINING PROGRAM

## 2018-04-11 PROCEDURE — 80202 ASSAY OF VANCOMYCIN: CPT

## 2018-04-11 PROCEDURE — 700102 HCHG RX REV CODE 250 W/ 637 OVERRIDE(OP): Performed by: ORTHOPAEDIC SURGERY

## 2018-04-11 PROCEDURE — 84100 ASSAY OF PHOSPHORUS: CPT

## 2018-04-11 PROCEDURE — 700105 HCHG RX REV CODE 258: Performed by: INTERNAL MEDICINE

## 2018-04-11 PROCEDURE — 770021 HCHG ROOM/CARE - ISO PRIVATE

## 2018-04-11 PROCEDURE — A9270 NON-COVERED ITEM OR SERVICE: HCPCS | Performed by: ORTHOPAEDIC SURGERY

## 2018-04-11 PROCEDURE — 83735 ASSAY OF MAGNESIUM: CPT

## 2018-04-11 PROCEDURE — 700112 HCHG RX REV CODE 229: Performed by: ORTHOPAEDIC SURGERY

## 2018-04-11 PROCEDURE — A9270 NON-COVERED ITEM OR SERVICE: HCPCS | Performed by: STUDENT IN AN ORGANIZED HEALTH CARE EDUCATION/TRAINING PROGRAM

## 2018-04-11 PROCEDURE — 700111 HCHG RX REV CODE 636 W/ 250 OVERRIDE (IP): Performed by: INTERNAL MEDICINE

## 2018-04-11 PROCEDURE — 80053 COMPREHEN METABOLIC PANEL: CPT

## 2018-04-11 PROCEDURE — 700101 HCHG RX REV CODE 250: Performed by: STUDENT IN AN ORGANIZED HEALTH CARE EDUCATION/TRAINING PROGRAM

## 2018-04-11 PROCEDURE — A9270 NON-COVERED ITEM OR SERVICE: HCPCS | Performed by: INTERNAL MEDICINE

## 2018-04-11 PROCEDURE — 700102 HCHG RX REV CODE 250 W/ 637 OVERRIDE(OP): Performed by: STUDENT IN AN ORGANIZED HEALTH CARE EDUCATION/TRAINING PROGRAM

## 2018-04-11 PROCEDURE — 36415 COLL VENOUS BLD VENIPUNCTURE: CPT

## 2018-04-11 PROCEDURE — 700102 HCHG RX REV CODE 250 W/ 637 OVERRIDE(OP): Performed by: INTERNAL MEDICINE

## 2018-04-11 PROCEDURE — 93010 ELECTROCARDIOGRAM REPORT: CPT | Performed by: INTERNAL MEDICINE

## 2018-04-11 PROCEDURE — 700111 HCHG RX REV CODE 636 W/ 250 OVERRIDE (IP): Performed by: STUDENT IN AN ORGANIZED HEALTH CARE EDUCATION/TRAINING PROGRAM

## 2018-04-11 PROCEDURE — 99233 SBSQ HOSP IP/OBS HIGH 50: CPT | Mod: GC | Performed by: INTERNAL MEDICINE

## 2018-04-11 PROCEDURE — 700111 HCHG RX REV CODE 636 W/ 250 OVERRIDE (IP): Performed by: ORTHOPAEDIC SURGERY

## 2018-04-11 RX ORDER — ENEMA 19; 7 G/133ML; G/133ML
1 ENEMA RECTAL ONCE
Status: COMPLETED | OUTPATIENT
Start: 2018-04-11 | End: 2018-04-11

## 2018-04-11 RX ORDER — POTASSIUM CHLORIDE 20 MEQ/1
40 TABLET, EXTENDED RELEASE ORAL ONCE
Status: COMPLETED | OUTPATIENT
Start: 2018-04-11 | End: 2018-04-11

## 2018-04-11 RX ADMIN — STANDARDIZED SENNA CONCENTRATE AND DOCUSATE SODIUM 2 TABLET: 8.6; 5 TABLET, FILM COATED ORAL at 08:58

## 2018-04-11 RX ADMIN — VITAMIN D, TAB 1000IU (100/BT) 1000 UNITS: 25 TAB at 08:58

## 2018-04-11 RX ADMIN — OXYCODONE HYDROCHLORIDE 10 MG: 10 TABLET ORAL at 16:03

## 2018-04-11 RX ADMIN — KETOROLAC TROMETHAMINE 15 MG: 30 INJECTION, SOLUTION INTRAMUSCULAR at 08:59

## 2018-04-11 RX ADMIN — OXYCODONE HYDROCHLORIDE 10 MG: 10 TABLET ORAL at 02:27

## 2018-04-11 RX ADMIN — POLYETHYLENE GLYCOL 3350 1 PACKET: 17 POWDER, FOR SOLUTION ORAL at 05:25

## 2018-04-11 RX ADMIN — KETOROLAC TROMETHAMINE 15 MG: 30 INJECTION, SOLUTION INTRAMUSCULAR at 02:27

## 2018-04-11 RX ADMIN — MAGNESIUM HYDROXIDE 30 ML: 400 SUSPENSION ORAL at 05:25

## 2018-04-11 RX ADMIN — CEFTRIAXONE 2 G: 2 INJECTION, POWDER, FOR SOLUTION INTRAMUSCULAR; INTRAVENOUS at 08:58

## 2018-04-11 RX ADMIN — DOCUSATE SODIUM 100 MG: 100 CAPSULE ORAL at 08:57

## 2018-04-11 RX ADMIN — IRON SUCROSE 200 MG: 20 INJECTION, SOLUTION INTRAVENOUS at 08:58

## 2018-04-11 RX ADMIN — OXYCODONE HYDROCHLORIDE 10 MG: 10 TABLET ORAL at 21:53

## 2018-04-11 RX ADMIN — OXYCODONE HYDROCHLORIDE 10 MG: 10 TABLET ORAL at 06:49

## 2018-04-11 RX ADMIN — POTASSIUM CHLORIDE 40 MEQ: 1500 TABLET, EXTENDED RELEASE ORAL at 16:03

## 2018-04-11 RX ADMIN — AMITRIPTYLINE HYDROCHLORIDE 25 MG: 25 TABLET, FILM COATED ORAL at 23:15

## 2018-04-11 RX ADMIN — ACETAMINOPHEN 650 MG: 325 TABLET, FILM COATED ORAL at 14:11

## 2018-04-11 RX ADMIN — ENOXAPARIN SODIUM 40 MG: 100 INJECTION SUBCUTANEOUS at 14:11

## 2018-04-11 RX ADMIN — VANCOMYCIN HYDROCHLORIDE 600 MG: 100 INJECTION, POWDER, LYOPHILIZED, FOR SOLUTION INTRAVENOUS at 16:04

## 2018-04-11 RX ADMIN — ACETAMINOPHEN 650 MG: 325 TABLET, FILM COATED ORAL at 23:15

## 2018-04-11 RX ADMIN — DIBASIC SODIUM PHOSPHATE, MONOBASIC POTASSIUM PHOSPHATE AND MONOBASIC SODIUM PHOSPHATE 2 TABLET: 852; 155; 130 TABLET ORAL at 16:03

## 2018-04-11 RX ADMIN — SODIUM PHOSPHATE 133 ML: 7; 19 ENEMA RECTAL at 08:38

## 2018-04-11 RX ADMIN — ACETAMINOPHEN 650 MG: 325 TABLET, FILM COATED ORAL at 05:25

## 2018-04-11 ASSESSMENT — ENCOUNTER SYMPTOMS
BACK PAIN: 0
SEIZURES: 0
CONSTIPATION: 1
CHILLS: 0
BLURRED VISION: 0
DOUBLE VISION: 0
MYALGIAS: 0
SORE THROAT: 0
DIARRHEA: 0
HEMOPTYSIS: 0
TREMORS: 0
COUGH: 0
SHORTNESS OF BREATH: 0
NECK PAIN: 0
WHEEZING: 0
FEVER: 0
WEIGHT LOSS: 0
FOCAL WEAKNESS: 0
DIZZINESS: 0
HEADACHES: 1
DIZZINESS: 1
BRUISES/BLEEDS EASILY: 0
LOSS OF CONSCIOUSNESS: 0
NAUSEA: 0
ABDOMINAL PAIN: 0
PALPITATIONS: 0
HEARTBURN: 0
BLOOD IN STOOL: 0
VOMITING: 0

## 2018-04-11 ASSESSMENT — PAIN SCALES - GENERAL
PAINLEVEL_OUTOF10: 3
PAINLEVEL_OUTOF10: 2
PAINLEVEL_OUTOF10: 10
PAINLEVEL_OUTOF10: 7
PAINLEVEL_OUTOF10: 7
PAINLEVEL_OUTOF10: 2

## 2018-04-11 NOTE — DISCHARGE PLANNING
Medical Social Work    Per pts chart, pt needs a SNF referral. SW talked to daughter Megan per pt's request who requested that referrals be sent to [facility]. SW addressed all questions and encouraged follow up as needed. KYRA sent the choice form to [facility] and confirmed receipt of choice form. SW to monitor response status and follow up with care team.

## 2018-04-11 NOTE — NON-PROVIDER
Internal Medicine Interval Note  Note Author: Devon Hernandez, Student     Name Lizette Means     3/8/1931   Age/Sex 87 y.o. female   MRN [id number]   Code Status DNR     After 5PM or if no immediate response to page, please call for cross-coverage  Attending/Team: Dr. Morales See Patient List for primary contact information  Call [phone] to page    1st Call - Day Intern (R1):   Dr. Stuart 2nd Call - Day Sr. Resident (R2/R3):   Dr. Escobar         Reason for interval visit  (Principal Problem)   Closed left hip fracture, sequela    87 year old female who presented to [facility] as a transfer from [place] with a femoral neck fracture,  and osteomyelitis. She is s/p I&D of hip, fracture not yet fixed. On vancomycin and ceftriaxone.     Interval Problem Daily Status Update  (24 hours)   - Patient is feeling better after I&D, she is currently post op day 2  - Patient reports increased headaches and dizziness today which she attributes to not eating yet this morning  - States pain is a 10/10 this morning, patient does not appear to be in any acute distress   - Seen by ID yesterday who agree with current ABx plan   - Due to rapid progression they feel that the most likely organism is staph   - They plan on a at least 6 week course of IV therapy   - Will put PICC line in after blood cultures negative for a day or two  - Plan on enema today for constipation    - reports pain with defecation and enemas       Review of Systems   Constitutional: Negative for chills, fever and weight loss.   HENT: Negative for hearing loss and tinnitus.    Eyes: Negative for blurred vision and double vision.   Respiratory: Negative for cough and wheezing.    Cardiovascular: Negative for chest pain, palpitations and leg swelling.   Gastrointestinal: Positive for constipation. Negative for abdominal pain, blood in stool, diarrhea, heartburn, nausea and vomiting.   Genitourinary: Negative for dysuria, frequency and urgency.    Musculoskeletal: Negative for myalgias.   Skin: Negative for rash.   Neurological: Positive for dizziness and headaches. Negative for focal weakness, seizures and loss of consciousness.       Consultants/Specialty  Ortho  I&D    Disposition  Inpatient management of septic arthritis     Quality Measures  Quality-Core Measures   Reviewed items::  EKG reviewed, Medications reviewed, Labs reviewed and Radiology images reviewed  Bull catheter::  One or Two Days Post Surgery (Day of Surgery being Day 0)  DVT prophylaxis pharmacological::  Enoxaparin (Lovenox)  Antibiotics:  Treating active infection/contamination beyond 24 hours perioperative coverage      Physical Exam       Vitals:    04/10/18 2000 04/10/18 2051 04/11/18 0000 04/11/18 0400   BP: 122/61  117/56 116/59   Pulse: (!) 118 95 86 94   Resp: 19  17 17   Temp: 36.8 °C (98.3 °F)  36.2 °C (97.2 °F) 36.3 °C (97.4 °F)   SpO2: 96%  91% 94%   Weight:       Height:         Body mass index is 20.98 kg/m².    Oxygen Therapy:  Pulse Oximetry: 94 %, O2 (LPM): 0, O2 Delivery: None (Room Air)    Physical Exam   Constitutional: She is oriented to person, place, and time and well-developed, well-nourished, and in no distress.   HENT:   Head: Normocephalic and atraumatic.   Eyes: Pupils are equal, round, and reactive to light.   Neck: Normal range of motion. Neck supple.   Cardiovascular: Normal rate and regular rhythm.    Pulmonary/Chest: Effort normal and breath sounds normal.   Abdominal: She exhibits no distension and no mass. There is tenderness. There is guarding.   Tender to palpation in supra pubic area   Musculoskeletal:   MSK exam limited by fractured left hip   Neurological: She is alert and oriented to person, place, and time.   Skin: Skin is warm and dry. No erythema.   Dressing clean, dry and intact   Psychiatric: Affect normal.       Lab Data Review:         4/11/2018  6:35 AM    Recent Labs      04/09/18   1830  04/10/18   0303  04/11/18   0814   SODIUM  135   135  137   POTASSIUM  3.7  3.8  3.5*   CHLORIDE  102  103  102   CO2  23  24  27   BUN  32*  30*  26*   CREATININE  1.06  0.95  0.85   MAGNESIUM   --    --   2.1   PHOSPHORUS   --    --   2.3*   CALCIUM  7.9*  8.3*  8.1*       Recent Labs      04/09/18   1830  04/10/18   0303  04/11/18   0814   ALTSGPT  11  10  13   ASTSGOT  17  17  23   ALKPHOSPHAT  82  79  102*   TBILIRUBIN  0.4  0.4  0.4   GLUCOSE  75  75  100*       Recent Labs      04/09/18 1830  04/10/18   0303  04/11/18   0814   RBC  3.30*  3.40*  3.40*   HEMOGLOBIN  10.0*  10.1*  10.0*   HEMATOCRIT  30.0*  30.9*  30.7*   PLATELETCT  427  452*  468*   IRON   --   17*   --    FERRITIN   --   365.4*   --    TOTIRONBC   --   137*   --        Recent Labs      04/09/18   1830  04/10/18   0303  04/11/18   0814   WBC  23.4*  24.3*  22.9*   NEUTSPOLYS  83.10*  84.00*  83.40*   LYMPHOCYTES  10.20*  10.40*  9.90*   MONOCYTES  4.70  3.80  4.40   EOSINOPHILS  0.40  0.10  0.60   BASOPHILS  0.30  0.40  0.30   ASTSGOT  17  17  23   ALTSGPT  11  10  13   ALKPHOSPHAT  82  79  102*   TBILIRUBIN  0.4  0.4  0.4           Assessment/Plan     #Osteomyelitis  - Originally found on imaging in [place]   - Imaging showed spread of abscess to rectum  - Transferred from [place] with a left femoral head fracture and dislocation   - WBC of 23.4 on admission  - Underwent I&D on 4/9  - Seen by ID on 4/10 who agree with current antibiotic plan   - Due to rapid progression they feel that the most likely organism is staph   - They plan on at least a 6 week course of IV therapy   - Will put PICC line in after blood cultures negative for a 24-48 hours    Plan:  - Continue vancomycin and ceftriaxone  - Contact precautions until cultures are confirmed  - Continue antibiotic therapy per ID recommendations   - Plan for PICC line after blood cultures are negative for 24-48 hours  - Pain control per orthopaedics with:   - scheduled acetaminophen PO 650mg Q6   - oxycodone 5 or 10mg PO Q3 PRN        - hydromorphone 0.5mg IV Q3 PRN     #Left hip fracture   - Has had multiple GLF in the last month leading to inability to leave bed due to pain  - No surgical fixation at this time    Plan:  - Continue Vitamin D supplementation   - Pain control per orthopaedics with:   - scheduled acetaminophen PO 650mg Q6   - oxycodone 5 or 10mg PO Q3 PRN    - hydromorphone 0.5mg IV Q3 PRN   - Q2 hour turns to decrease risk of ulcer formation   - Fall precautions     #Anemia  - Hb of 10 on admission   - Patient reports bright red blood with BM   - Previous colonscopy showed benign poylps     Plan:  - FOBT  - Iron panel   - Plan to transfuse if Hb<7    #Pain  - Patient reported multiple days of 10/10 pain on 4/11  - Most likely due to destructive condition involving hip    Plan:  - Patient instructed to ask for pain medication if needed  - Continue to monitor and control pain     #Constipation   - Patient has problems with constipation chronically   - States that she takes stool softeners at home which seem to help    Plan:  - Plan on enema today to help with bowel motility   - Continue to monitor bowel movements     #Tachycardia with frequent PACs  - Patient has intermittent tachycardia with PACs  - Previously noted on EKGs on 4/10  - 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

## 2018-04-11 NOTE — DISCHARGE PLANNING
[facility] received a SNF choice form. Per the choice form the referral has been sent [facility], and [facility]

## 2018-04-11 NOTE — CARE PLAN
Problem: Safety  Goal: Will remain free from falls  Outcome: PROGRESSING AS EXPECTED  Pt calls for assistance appropriately when wanting to move or transfer    Problem: Skin Integrity  Goal: Risk for impaired skin integrity will decrease  Outcome: PROGRESSING AS EXPECTED  Pt does a good job of turning herself

## 2018-04-11 NOTE — PROGRESS NOTES
"Pharmacy Kinetics 87 y.o. female on vancomycin day # 3  2018    Currently on Vancomycin 600mg iv q24hr (1500)     Indication for Treatment: Osteomyelitis, SSTI     Pertinent history per medical record: Admitted on 2018 for osteomyelitis of hip. Patient presents with left hip abscess, surgery preformed I&D on 18 removed about 200 mL of purulent fluid . Empiric antibiotics have started.  Infectious disease will be consulted in the morning.      Other antibiotics: Ceftriaxone      Allergies: Patient has no known allergies.      List concerns for renal function: BUN/SCr ratio > 20:1, Hypoalbuminemia, Age      Pertinent cultures to date:   18: L hip abscess wound Cx = In process, no growth at 24 hours. Gram stain = many gram + cocci   18: L hip abscess anaerobic Cx = In process   18: PBC x one =  NGTD        Recent Labs      18   1830  04/10/18   0303  18   0814   WBC  23.4*  24.3*  22.9*   NEUTSPOLYS  83.10*  84.00*  83.40*     Recent Labs      18   1830  04/10/18   0303  18   0814   BUN  32*  30*  26*   CREATININE  1.06  0.95  0.85   ALBUMIN  2.2*  2.1*  2.3*     Recent Labs      18   1425   VANCOTROUGH  12.8     Intake/Output Summary (Last 24 hours) at 18 1643  Last data filed at 18 1614   Gross per 24 hour   Intake                0 ml   Output             1020 ml   Net            -1020 ml      Blood pressure 112/65, pulse 100, temperature 37.1 °C (98.7 °F), resp. rate 17, height 1.676 m (5' 6\"), weight 59 kg (130 lb), SpO2 90 %, not currently breastfeeding. Temp (24hrs), Av.6 °C (97.9 °F), Min:36.2 °C (97.2 °F), Max:37.1 °C (98.7 °F)      A/P   1. Vancomycin dose change: Not indicated, continue.   2. Next vancomycin level: ~ 1 - 2 days (not yet ordered)   3. Goal trough: 12 - 16 mcg/mL   4. Comments: Trough level resulted this afternoon at 12.8 mcg/mL, close to true trough level, is within desired range as outlined above. However, trough level " drawn prior to 3rd total dose of vancomycin, likely prior to drug achieving steady state concentrations. Dose currently appropriate, no dose change indicated. Repeat trough level likely Friday, April 13th to check for accumulation. Renal indices are stable. Cultures in process. Dr Fraire consulting - continue current therapy pending culture results.     Christina Vance, PharmD

## 2018-04-11 NOTE — DISCHARGE PLANNING
Transitional Care Navigator:    Per chart review patient has been identified as a candidate for SNF due to medical needs and therapy assessments. Please place order for SNF for DC planning.

## 2018-04-11 NOTE — PROGRESS NOTES
87 year old female seen in f/u for destructive left hip abscess and osteomyelitis    HPI: This is a very nice 87-year-old patient with rheumatoid   arthritis, apparently fell and hit her left hip and has now developed a   rapidly progressive destructive osteomyelitis and abscess of her left hip.    Dr. Patel  2 days ago  drained  about 200 mL of pus and left a drain   in.  The patient is feeling a lot better and presently, she is on   broad-spectrum antibiotics.  she does c/o rectal discomfort, but states she has had pain there for years   Related to hemoroids . States is eating and bowels working. Does have Bull in     Meds- iv ceftriaxone and iv vancomycin     ROS- denies fever or chills,              No respiratory complaints             No rash     PHYSICAL EXAMINATION:  VITAL SIGNS:  She is afebrile.  Her blood pressure is about 115/70.  GENERAL:  She is alert, cooperative, and fully oriented   NECK:  Supple.  I find no lymph nodes in her neck.  LUNGS:  Anteriorly are clear.  HEART:  I do not appreciate any heart murmur.  ABDOMEN:  Soft and no masses.  SKIN:  Shows no rash.  EXTREMITIES:  She has a drain in the left hip and she has generalized edema of   the left leg and minimal erythema.  She has pulses in both feet.  NEUROLOGIC:  She is oriented x3.  PSYCHIATRIC:  Her mood is completely normal.   Results for ALEC WALKER (MRN [id number]) as of 4/11/2018 15:43   Ref. Range 4/9/2018 20:15 4/9/2018 21:42 4/9/2018 21:42 4/9/2018 21:42   Significant Indicator Unknown NEG . NEG NEG   Site Unknown PERIPHERAL Left Hip Abscess Left Hip Abscess Left Hip Abscess   Source Unknown BLD WND WND WND       Assess: This severe infection with rapid progression is   most consistent with staph, however surprising that cultures are negative so far. ? Anaerobes  She is tolerating therapy well so far, so will await further culture results tomorrow  REC: place PICC line as blood cultures are negative, cont present regimen pending  further culture results

## 2018-04-11 NOTE — PROGRESS NOTES
"Patient seen and examined  Cultures still in lab    Blood pressure 112/65, pulse 100, temperature 37.1 °C (98.7 °F), resp. rate 17, height 1.676 m (5' 6\"), weight 59 kg (130 lb), SpO2 90 %, not currently breastfeeding.    Recent Labs      04/09/18   1830  04/10/18   0303  04/11/18   0814   WBC  23.4*  24.3*  22.9*   RBC  3.30*  3.40*  3.40*   HEMOGLOBIN  10.0*  10.1*  10.0*   HEMATOCRIT  30.0*  30.9*  30.7*   MCV  90.9  90.9  90.3   MCH  30.3  29.7  29.4   MCHC  33.3*  32.7*  32.6*   RDW  45.1  45.8  44.7   PLATELETCT  427  452*  468*   MPV  9.5  9.2  8.8*       No acute distress  Dressing clean dry and intact  Neurovascularly intact    POD#2    Plan:  DVT Prophylaxis- TEDS/SCDs  Weight Bearing Status-WBAT  PT/OT  Antibiotics: per ID  Case Coordination          "

## 2018-04-11 NOTE — DISCHARGE PLANNING
Medical Social Work    SW provided education on pts Medicare rights and provided a copy of IMM to PT. SW addressed all questions and encouraged follow up as needed. SW placed the signed IMM in folder to get digitally scanned into pts medical record. SW to remain available.

## 2018-04-11 NOTE — CONSULTS
DATE OF SERVICE:  04/10/2018    REQUESTING PHYSICIAN:  Primitivo Morales MD    IDENTIFICATION:  An 87-year-old female seen for antibiotic advice for severe   infection of her left hip.    HISTORY OF PRESENT ILLNESS:  This is a very nice 87-year-old patient who lives   in [place], [place], with her daughter.  She says her only major medical  problem is   just rheumatoid arthritis, for which she takes over-the-counter Naprosyn;   otherwise, has no significant medical problems.  However, because of her   arthritis, she uses a wheelchair to get around.  She states she is able to   support herself with standing.  About 3 weeks ago, she said that she was   trying to transfer herself from her wheelchair to her bed when she fell and   hit her left hip.  She has not been able to walk since then and her pain has   gotten worse without any associated fever or chills.  The pain got so bad   yesterday, this constant severe pain developed and had some radiation to her   rectal area and she went to [place] emergency room.  She had a CAT scan and   plain films, which showed destruction of the left acetabulum and femoral head   and an abscess was noted on the CT scan.  She said she has not received   Antibiotics in [facility], but was transferred by ambulance to [facility] where she was   admitted yesterday evening.  At that time, she was consulted on by Dr. Patel and very shortly thereafter last night, she went to surgery.  He   opened her hip up  with a posterior approach and immediately entering the fascia, found   about 200 mL of purulent fluid, which he sent for culture.  This was in   continuity with the norberto-acetabulum and the joint surface was irrigated with   saline and closed over a drain.  He recommended IV antibiotics and further   consultation pending culture results.  The patient states she feels a lot   better today after this abscess was drained and she is presently on IV   therapy.  She was surprised that  she had infection there even though it was   painful, appreciate no fever, no chills, and no redness was noted.  At the   present time, the patient is quite alert.  She is in her own room.  She has a   drain in as well as a Bull catheter.    PAST MEDICAL HISTORY:  Unremarkable except for the arthritis, for which she   takes anti-inflammatories.  She also has a history of hypertension, which she   is on medicines.    PAST SURGICAL HISTORY:  Just benign breast biopsies.    MEDICATIONS:  At the time of transfer include blood pressure pills and   amitriptyline as well as Naprosyn and stool softeners.    MEDICATIONS:  Here in the hospital include vancomycin and Rocephin 2 g a day   as well as pain medicines.    ALLERGIES:  None known.    FAMILY HISTORY:  Cancer in her sister, otherwise unremarkable.    SOCIAL HISTORY:  The patient says she has been a  for 4 years.  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    HABITS:  Former smoker.  No alcohol or drug use.    REVIEW OF SYSTEMS:  CONSTITUTIONAL:  She is denying any headache or confusion.  The chart says she   may have dementia, but she seems very sharp when I was speaking with her.  RESPIRATORY:  She denies any respiratory complaints.  She is not requiring any   oxygen and does not have any history of pneumonia.  GASTROINTESTINAL:  She had rectal pain, which seems to have been relieved by   the surgery she underwent.  SKIN:  She has no rash or other problems.  ENDOCRINE:  Denies diabetes or thyroid problems.    PHYSICAL EXAMINATION:  VITAL SIGNS:  She is presently afebrile.  Her blood pressure is about 115/70.  GENERAL:  She is alert, cooperative, and fully oriented and very pleasant.    She is not on oxygen.  NECK:  Supple.  I find no lymph nodes in her neck.  LUNGS:  Anteriorly are clear.  HEART:  I do not appreciate any heart murmur.  ABDOMEN:  Soft  and no masses.  SKIN:  Shows no rash.  EXTREMITIES:  She has a drain in the left hip and she has generalized edema of   the left leg and minimal erythema.  She has pulses in both feet.  NEUROLOGIC:  She is oriented x3.  PSYCHIATRIC:  Her mood is completely normal.    LABORATORY DATA:  I reviewed her x-ray from [facility] and that shows   complete destruction of the acetabulum and destruction of the femoral head.    CT scan apparently also confirms an abscess as well as some extension   posteriorly consistent with this abscess.  Her labs here show white count of   23,000 on admission and this morning 24,000.  She is somewhat anemic,   hemoglobin 10.1%.  Her sed rate was 104.  Her chemistry panel shows normal   renal function, normal transaminases, albumin is only 2.2 on admission last   night; otherwise, unremarkable.  Urinalysis, trace amounts of some findings.    Her stat CRP was 26.  Microbiology:  Blood cultures are pending or negative so   far.  They have not been listed yet, but review orders showed that he   collected Gram stain and culture intraoperatively yesterday.    ASSESSMENT:  This is a very nice 87-year-old patient with rheumatoid   arthritis, apparently fell and hit her left hip and has now developed a   rapidly progressive destructive osteomyelitis and abscess of her left hip.    Dr. Patel has now drained and washed out about 200 mL of pus and left a drain   in.  The patient is feeling a lot better and presently, she is on   broad-spectrum antibiotics.  This severe infection with rapid progression is   most consistent with staph, so I suspect it would only be Staph aureus and   less likely methicillin-resistant Staphylococcus aureus.  At the present time,   I agree with her broad antibiotics and hopefully over the next 48 hours, we   will get a final culture determination.    RECOMMENDATIONS:  1.  Continue present antibiotics.  2.  Monitor culture results and narrow once we get final  results.  3.  With her severe destructive bone infection, I think we are looking at   least 6 weeks of IV antibiotics while we follow her sed rates and clinical   response, but even after 6 weeks, she may benefit from either extended course   of IV therapy or extended course of oral treatment based on culture results.    Thank you very much.  I will continue to follow the patient with you.  We can   probably put a PICC line in as soon as the blood cultures remain negative for   a day or two.       ____________________________________     MD DEVYN PINEDOK / NTS    DD:  04/10/2018 17:31:28  DT:  04/10/2018 18:21:02    D#:  [id number]  Job#:  [id number]

## 2018-04-11 NOTE — PROGRESS NOTES
Internal Medicine Interval Note  Note Author: Zo Stuart M.D.     Name Lizette Means     3/8/1931   Age/Sex 87 y.o. female   MRN [id number]   Code Status DNR     After 5PM or if no immediate response to page, please call for cross-coverage  Attending/Team: Dr. Morales /Mary See Patient List for primary contact information  Call [phone] to page    1st Call - Day Intern (R1):   Dr. Stuart 2nd Call - Day Sr. Resident (R2/R3):   Dr. Escobar         Reason for interval visit  (Principal Problem)   Closed left hip fracture, sequela    Interval Problem Daily Status Update  (24 hours)   Pt transferred from [facility] overnight for septic arthritis of left hip. Taken urgently to the OR for I&D, started on ceftriaxone, vancomycin. Cultures pending.   Concern for perirectal abscess, obliterated left femoral head on imaging.    Review of Systems   Constitutional: Negative for chills and fever.   HENT: Negative for congestion and sore throat.    Eyes: Negative for blurred vision and double vision.   Respiratory: Negative for cough, hemoptysis and shortness of breath.    Cardiovascular: Negative for chest pain and palpitations.   Gastrointestinal: Negative for nausea and vomiting.   Genitourinary: Negative for dysuria and urgency.   Musculoskeletal: Negative for back pain and neck pain.   Skin: Negative for itching and rash.   Neurological: Negative for dizziness, tremors and seizures.   Endo/Heme/Allergies: Negative for environmental allergies. Does not bruise/bleed easily.       Consultants/Specialty  Orthopedic surgery    Disposition  Inpatient for treatment of septic arthritis    Quality Measures  Quality-Core Measures   Reviewed items::  EKG reviewed, Medications reviewed, Labs reviewed and Radiology images reviewed  Bull catheter::  One or Two Days Post Surgery (Day of Surgery being Day 0)  DVT prophylaxis pharmacological::  Contraindicated - High bleeding risk  DVT prophylaxis - mechanical:   SCDs  Ulcer Prophylaxis::  Not indicated  Antibiotics:  Treating active infection/contamination beyond 24 hours perioperative coverage  Assessed for rehabilitation services:  Patient was assess for and/or received rehabilitation services during this hospitalization      Physical Exam     Vitals:    04/10/18 1134 04/10/18 1600 04/10/18 2000 04/10/18 2051   BP: 119/59 120/65 122/61    Pulse: 83 86 (!) 118 95   Resp: 16 16 19    Temp: 36.5 °C (97.7 °F) 36.3 °C (97.4 °F) 36.8 °C (98.3 °F)    SpO2: 92% 94% 96%    Weight:       Height:         Body mass index is 20.98 kg/m².    Oxygen Therapy:  Pulse Oximetry: 96 %, O2 (LPM): 0, O2 Delivery: None (Room Air)    Physical Exam   Constitutional: She is oriented to person, place, and time and well-developed, well-nourished, and in no distress.   HENT:   Head: Normocephalic and atraumatic.   Mouth/Throat: No oropharyngeal exudate.   Eyes: Conjunctivae are normal. Pupils are equal, round, and reactive to light. No scleral icterus.   Neck: Normal range of motion. Neck supple. No thyromegaly present.   Cardiovascular: Normal rate, regular rhythm, normal heart sounds and intact distal pulses.  Exam reveals no gallop and no friction rub.    No murmur heard.  Pulmonary/Chest: Effort normal and breath sounds normal. No respiratory distress. She has no wheezes. She has no rales.   Abdominal: Soft. Bowel sounds are normal. She exhibits no distension. There is no tenderness. There is no guarding.   Genitourinary: Rectal exam shows tenderness (anteriorly and left).   Musculoskeletal: Normal range of motion. She exhibits no edema.   LLE neurovascularly intact with 5/5 strength, DP/PT pulses present. Sterile dressing to hip c/d/i, surrounding skin nonerythematous, nonedematous.   Lymphadenopathy:     She has no cervical adenopathy.   Neurological: She is alert and oriented to person, place, and time. No cranial nerve deficit.   Skin: Skin is warm and dry.   Psychiatric: Mood and affect  normal.       Lab Data Review:     Recent Labs      04/09/18   1830  04/10/18   0303   SODIUM  135  135   POTASSIUM  3.7  3.8   CHLORIDE  102  103   CO2  23  24   BUN  32*  30*   CREATININE  1.06  0.95   CALCIUM  7.9*  8.3*       Recent Labs      04/09/18   1830  04/10/18   0303   ALTSGPT  11  10   ASTSGOT  17  17   ALKPHOSPHAT  82  79   TBILIRUBIN  0.4  0.4   GLUCOSE  75  75       Recent Labs      04/09/18 1830  04/10/18   0303   RBC  3.30*  3.40*   HEMOGLOBIN  10.0*  10.1*   HEMATOCRIT  30.0*  30.9*   PLATELETCT  427  452*   IRON   --   17*   FERRITIN   --   365.4*   TOTIRONBC   --   137*       Recent Labs      04/09/18 1830  04/10/18   0303   WBC  23.4*  24.3*   NEUTSPOLYS  83.10*  84.00*   LYMPHOCYTES  10.20*  10.40*   MONOCYTES  4.70  3.80   EOSINOPHILS  0.40  0.10   BASOPHILS  0.30  0.40   ASTSGOT  17  17   ALTSGPT  11  10   ALKPHOSPHAT  82  79   TBILIRUBIN  0.4  0.4           Assessment/Plan     * Closed left hip fracture, sequela- (present on admission)   Assessment & Plan    GLF 3 days ago & landed on left side. S/p I&D left hip on 4/9/18. No implants in left hip.   - Fall/ seizure/ aspiration precautions   - continue Vancomycin, ceftriaxone  - consult ID for recommendations  - continue pain control with scheduled acetaminophen, prn oxycodone, IV hydromorphone for breakthrough  - supplement vitamin D         Osteomyelitis (CMS-[facility])   Assessment & Plan    Identified on CT scan, no MRI available. Transferred from [facility] for Left femoral head fracture & dislocation associated with surrounding abscess that has spread to rectum which were found on  Imaging study. ED physician consulted Dr. Patel (Ortho) & pt was taken to OR urgently. Pt did not have any implants in the effected area.  - Fall / seizure / aspiration precautions   - continue Vancomycin and ceftriaxone  - pain control by ortho   - consult ID  - continue vitamin D        Anemia   Assessment & Plan    H/H 10/30 on admit. History of  Hemorrhoids. Bright red blood in the toilet with every bowel movement, no black stool. Pt had colonoscopy in the past which showed benign polyps.  - daily CBC   - Will transfuse if Hb < 7  - ordered Ferritin, Iron panel, B12 , Folate   - FOBT ordered           Constipation- (present on admission)   Assessment & Plan    Chronic constipation. May be exacerbated by opiate pain medications.  - continue bowel protocol   - encourage bowel regimen         Debility- (present on admission)   Assessment & Plan    Age 87, admitted for Left femoral head fracture & dislocation associated with surrounding abscess that has spread to rectum. GLF 3 weeks ago resulting in patient becoming bed bound. Pt used wheelchair to ambulate at baseline.  - continue vitamin D supplementation  - continue pressure ulcer prevention protocol   - PT/ OT as tolerated, appreciate discharge recs        Fall from ground level- (present on admission)   Assessment & Plan    Multiple ground level falls for the last month & 3 weeks ago, she had a ground level fall & landed on her left side  - Vitamin D started   - PT/OT as tolerated        Pain   Assessment & Plan    - pain after ortho Sx  - pain control by Sx   - continue ketorolac scheduled & oxy 5 mg / 10 mg, Dilaudid prn         Hypertension   Assessment & Plan    - on lisinopril & Chlorthiazide at home  - will hold BP medication in the setting of sepsis         Dementia   Assessment & Plan    - chronic   - will CTM

## 2018-04-11 NOTE — PROGRESS NOTES
Full consult note dictated  She is a very nice 87 year old woman with a severe destructive osteomyelitis/abscess of left hip after a fall  Now on empiric vancomycin and ceftriaxone  All blood and wound  cultures pending   Will await final culture results before making final antibiotic recommendations , will need minimum of 6 weeks of iv antibiotic therapy

## 2018-04-11 NOTE — DISCHARGE PLANNING
[facility] received a message from Ramonita at [facility] the referral had been accepted.

## 2018-04-11 NOTE — THERAPY
"Physical Therapy Evaluation completed.   Bed Mobility:  Supine to Sit: Minimal Assist  Transfers: Sit to Stand: Unable to Participate  Gait: Level Of Assist: Unable to Participate with No Equipment Needed       Plan of Care: Will benefit from Physical Therapy 3 times per week  Discharge Recommendations: Equipment: Will Continue to Assess for Equipment Needs. Post-acute therapy Discharge to a transitional care facility for continued skilled therapy services.(SNF)    See \"Rehab Therapy-Acute\" Patient Summary Report for complete documentation.     "

## 2018-04-12 PROBLEM — K59.00 CONSTIPATION: Chronic | Status: RESOLVED | Noted: 2018-04-09 | Resolved: 2018-04-12

## 2018-04-12 LAB
B-HCG SERPL-ACNC: 2.1 MIU/ML (ref 0–5)
BACTERIA UR CULT: NORMAL
SIGNIFICANT IND 70042: NORMAL
SITE SITE: NORMAL
SOURCE SOURCE: NORMAL

## 2018-04-12 PROCEDURE — A9270 NON-COVERED ITEM OR SERVICE: HCPCS | Performed by: INTERNAL MEDICINE

## 2018-04-12 PROCEDURE — 700111 HCHG RX REV CODE 636 W/ 250 OVERRIDE (IP): Performed by: INTERNAL MEDICINE

## 2018-04-12 PROCEDURE — 700102 HCHG RX REV CODE 250 W/ 637 OVERRIDE(OP): Performed by: INTERNAL MEDICINE

## 2018-04-12 PROCEDURE — 700105 HCHG RX REV CODE 258: Performed by: INTERNAL MEDICINE

## 2018-04-12 PROCEDURE — 700102 HCHG RX REV CODE 250 W/ 637 OVERRIDE(OP): Performed by: STUDENT IN AN ORGANIZED HEALTH CARE EDUCATION/TRAINING PROGRAM

## 2018-04-12 PROCEDURE — 99232 SBSQ HOSP IP/OBS MODERATE 35: CPT | Mod: GC | Performed by: INTERNAL MEDICINE

## 2018-04-12 PROCEDURE — A9270 NON-COVERED ITEM OR SERVICE: HCPCS | Performed by: ORTHOPAEDIC SURGERY

## 2018-04-12 PROCEDURE — 84702 CHORIONIC GONADOTROPIN TEST: CPT

## 2018-04-12 PROCEDURE — 700105 HCHG RX REV CODE 258

## 2018-04-12 PROCEDURE — 97530 THERAPEUTIC ACTIVITIES: CPT

## 2018-04-12 PROCEDURE — 770021 HCHG ROOM/CARE - ISO PRIVATE

## 2018-04-12 PROCEDURE — 700102 HCHG RX REV CODE 250 W/ 637 OVERRIDE(OP): Performed by: ORTHOPAEDIC SURGERY

## 2018-04-12 PROCEDURE — 700111 HCHG RX REV CODE 636 W/ 250 OVERRIDE (IP): Performed by: ORTHOPAEDIC SURGERY

## 2018-04-12 PROCEDURE — 97535 SELF CARE MNGMENT TRAINING: CPT

## 2018-04-12 PROCEDURE — 700111 HCHG RX REV CODE 636 W/ 250 OVERRIDE (IP): Performed by: STUDENT IN AN ORGANIZED HEALTH CARE EDUCATION/TRAINING PROGRAM

## 2018-04-12 PROCEDURE — 36415 COLL VENOUS BLD VENIPUNCTURE: CPT

## 2018-04-12 PROCEDURE — 700112 HCHG RX REV CODE 229: Performed by: ORTHOPAEDIC SURGERY

## 2018-04-12 PROCEDURE — A9270 NON-COVERED ITEM OR SERVICE: HCPCS | Performed by: STUDENT IN AN ORGANIZED HEALTH CARE EDUCATION/TRAINING PROGRAM

## 2018-04-12 RX ORDER — SODIUM CHLORIDE 9 MG/ML
INJECTION, SOLUTION INTRAVENOUS
Status: COMPLETED
Start: 2018-04-12 | End: 2018-04-12

## 2018-04-12 RX ORDER — POTASSIUM CHLORIDE 20 MEQ/1
40 TABLET, EXTENDED RELEASE ORAL 2 TIMES DAILY
Status: COMPLETED | OUTPATIENT
Start: 2018-04-12 | End: 2018-04-12

## 2018-04-12 RX ADMIN — AMITRIPTYLINE HYDROCHLORIDE 25 MG: 25 TABLET, FILM COATED ORAL at 20:37

## 2018-04-12 RX ADMIN — OXYCODONE HYDROCHLORIDE 10 MG: 10 TABLET ORAL at 14:20

## 2018-04-12 RX ADMIN — DIBASIC SODIUM PHOSPHATE, MONOBASIC POTASSIUM PHOSPHATE AND MONOBASIC SODIUM PHOSPHATE 1 TABLET: 852; 155; 130 TABLET ORAL at 14:20

## 2018-04-12 RX ADMIN — OXYCODONE HYDROCHLORIDE 10 MG: 10 TABLET ORAL at 10:28

## 2018-04-12 RX ADMIN — SODIUM CHLORIDE 500 ML: 9 INJECTION, SOLUTION INTRAVENOUS at 14:21

## 2018-04-12 RX ADMIN — DIPHENHYDRAMINE HYDROCHLORIDE 25 MG: 50 INJECTION INTRAMUSCULAR; INTRAVENOUS at 20:35

## 2018-04-12 RX ADMIN — DIBASIC SODIUM PHOSPHATE, MONOBASIC POTASSIUM PHOSPHATE AND MONOBASIC SODIUM PHOSPHATE 1 TABLET: 852; 155; 130 TABLET ORAL at 20:35

## 2018-04-12 RX ADMIN — VITAMIN D, TAB 1000IU (100/BT) 1000 UNITS: 25 TAB at 08:41

## 2018-04-12 RX ADMIN — CEFTRIAXONE 2 G: 2 INJECTION, POWDER, FOR SOLUTION INTRAMUSCULAR; INTRAVENOUS at 08:43

## 2018-04-12 RX ADMIN — ENOXAPARIN SODIUM 40 MG: 100 INJECTION SUBCUTANEOUS at 08:40

## 2018-04-12 RX ADMIN — POTASSIUM CHLORIDE 40 MEQ: 1500 TABLET, EXTENDED RELEASE ORAL at 08:41

## 2018-04-12 RX ADMIN — AMPICILLIN SODIUM AND SULBACTAM SODIUM 1.5 G: 1; .5 INJECTION, POWDER, FOR SOLUTION INTRAMUSCULAR; INTRAVENOUS at 21:42

## 2018-04-12 RX ADMIN — ACETAMINOPHEN 650 MG: 325 TABLET, FILM COATED ORAL at 06:32

## 2018-04-12 RX ADMIN — ACETAMINOPHEN 650 MG: 325 TABLET, FILM COATED ORAL at 14:29

## 2018-04-12 RX ADMIN — OXYCODONE HYDROCHLORIDE 5 MG: 5 TABLET ORAL at 20:35

## 2018-04-12 RX ADMIN — POTASSIUM CHLORIDE 40 MEQ: 1500 TABLET, EXTENDED RELEASE ORAL at 20:35

## 2018-04-12 RX ADMIN — OXYCODONE HYDROCHLORIDE 10 MG: 10 TABLET ORAL at 06:32

## 2018-04-12 RX ADMIN — DIBASIC SODIUM PHOSPHATE, MONOBASIC POTASSIUM PHOSPHATE AND MONOBASIC SODIUM PHOSPHATE 1 TABLET: 852; 155; 130 TABLET ORAL at 08:41

## 2018-04-12 RX ADMIN — IRON SUCROSE 200 MG: 20 INJECTION, SOLUTION INTRAVENOUS at 09:14

## 2018-04-12 ASSESSMENT — ENCOUNTER SYMPTOMS
LOSS OF CONSCIOUSNESS: 0
VOMITING: 0
HEARTBURN: 0
DIZZINESS: 0
PHOTOPHOBIA: 0
CONSTIPATION: 0
FEVER: 0
NERVOUS/ANXIOUS: 1
HEADACHES: 0
NAUSEA: 0
CHILLS: 0
WEIGHT LOSS: 0
DIARRHEA: 1
PALPITATIONS: 0
SHORTNESS OF BREATH: 0
COUGH: 0
ABDOMINAL PAIN: 0
BLURRED VISION: 0

## 2018-04-12 ASSESSMENT — COGNITIVE AND FUNCTIONAL STATUS - GENERAL
TURNING FROM BACK TO SIDE WHILE IN FLAT BAD: A LITTLE
WALKING IN HOSPITAL ROOM: TOTAL
STANDING UP FROM CHAIR USING ARMS: A LOT
DRESSING REGULAR UPPER BODY CLOTHING: A LITTLE
CLIMB 3 TO 5 STEPS WITH RAILING: TOTAL
DAILY ACTIVITIY SCORE: 16
SUGGESTED CMS G CODE MODIFIER DAILY ACTIVITY: CK
MOVING TO AND FROM BED TO CHAIR: UNABLE
HELP NEEDED FOR BATHING: A LOT
PERSONAL GROOMING: A LITTLE
MOBILITY SCORE: 9
TOILETING: A LOT
SUGGESTED CMS G CODE MODIFIER MOBILITY: CM
DRESSING REGULAR LOWER BODY CLOTHING: A LOT
MOVING FROM LYING ON BACK TO SITTING ON SIDE OF FLAT BED: UNABLE

## 2018-04-12 ASSESSMENT — GAIT ASSESSMENTS: GAIT LEVEL OF ASSIST: UNABLE TO PARTICIPATE

## 2018-04-12 ASSESSMENT — PAIN SCALES - GENERAL
PAINLEVEL_OUTOF10: 0
PAINLEVEL_OUTOF10: 10
PAINLEVEL_OUTOF10: 7

## 2018-04-12 NOTE — NON-PROVIDER
Internal Medicine Interval Note  Note Author: Devon Hernandez, Student     Name Lizette Means     3/8/1931   Age/Sex 87 y.o. female   MRN [id number]   Code Status DNR     After 5PM or if no immediate response to page, please call for cross-coverage  Attending/Team: Dr. Morales See Patient List for primary contact information  Call [phone] to page    1st Call - Day Intern (R1):   Dr. Stuart 2nd Call - Day Sr. Resident (R2/R3):   Dr. Escobar         Reason for interval visit  (Principal Problem)   Sepsis (CMS-[facility])    87 year old female who presented to [facility] as a transfer from [place] with osteomyelitis. She is s/p I&D of hip. Originally on vancomycin and ceftriaxone. Switched to amp/sulbactam iv 1.5 grams every 6 hours     Interval Problem Daily Status Update  (24 hours)   - Blood cultures negative  - Urine culture negative   - ID review of gram stain showed multiple intracellular gram negative diplococci consistent with moraxella   - ID recommend switching to amp/sulbactam iv 1.5 grams every 6 hours   - ID recommends placement of PICC line due to blood cultures being negative   - Patient is currently weight bearing as tolerated     Review of Systems   Constitutional: Negative for chills, fever and weight loss.   HENT: Negative for hearing loss.    Eyes: Negative for blurred vision and photophobia.   Respiratory: Negative for cough and shortness of breath.    Cardiovascular: Negative for chest pain, palpitations and leg swelling.   Gastrointestinal: Positive for diarrhea. Negative for abdominal pain, constipation, heartburn, nausea and vomiting.        Rectal pain with diarrhea   Genitourinary: Negative for dysuria and urgency.   Neurological: Negative for dizziness, loss of consciousness and headaches.   Psychiatric/Behavioral: The patient is nervous/anxious.        Consultants/Specialty  Ortho  I&D     Disposition  Inpatient management of septic arthritis     Quality Measures  Quality-Core  Measures  Reviewed items::  EKG reviewed, Medications reviewed, Labs reviewed and Radiology images reviewed  Bull catheter:: No Bull    DVT prophylaxis pharmacological::  Enoxaparin (Lovenox) and SCDs  Antibiotics:  Treating active infection/contamination beyond 24 hours perioperative coverage      Physical Exam       Vitals:    04/11/18 0800 04/11/18 1600 04/11/18 1955 04/12/18 0435   BP: 112/65 136/69 137/68 140/60   Pulse: 100 82 94 95   Resp: 17 17 16 15   Temp: 37.1 °C (98.7 °F) 36.1 °C (97 °F) 36.3 °C (97.3 °F) 36.8 °C (98.3 °F)   SpO2: 90% 92% 91% 94%   Weight:       Height:         Body mass index is 20.98 kg/m².    Oxygen Therapy:  Pulse Oximetry: 94 %, O2 (LPM): 0, O2 Delivery: None (Room Air)    Physical Exam  Constitutional: Anxious, She is oriented to person, place, and time and well-developed, well-nourished.   HENT:   Head: Normocephalic and atraumatic.   Eyes: Pupils are equal, round, and reactive to light.   Neck: Normal range of motion. Neck supple.   Cardiovascular: Normal rate and regular rhythm.    Pulmonary/Chest: Effort normal and breath sounds normal.   Abdominal: She exhibits no distension and no mass. No tenderness or guarding   Musculoskeletal:   MSK exam limited by fractured left hip   Neurological: She is alert and oriented to person, place, and time.   Skin: Skin is warm and dry. No erythema.   Dressing clean, dry and intact   Psychiatric: Anxious about pain that she is experiencing, she wants to avoid a heart attack     Lab Data Review:         4/12/2018  6:06 AM    Recent Labs      04/09/18   1830  04/10/18   0303  04/11/18   0814   SODIUM  135  135  137   POTASSIUM  3.7  3.8  3.5*   CHLORIDE  102  103  102   CO2  23  24  27   BUN  32*  30*  26*   CREATININE  1.06  0.95  0.85   MAGNESIUM   --    --   2.1   PHOSPHORUS   --    --   2.3*   CALCIUM  7.9*  8.3*  8.1*       Recent Labs      04/09/18   1830  04/10/18   0303  04/11/18   0814   ALTSGPT  11  10  13   ASTSGOT  17  17  23    ALKPHOSPHAT  82  79  102*   TBILIRUBIN  0.4  0.4  0.4   GLUCOSE  75  75  100*       Recent Labs      04/09/18   1830  04/10/18   0303  04/11/18   0814   RBC  3.30*  3.40*  3.40*   HEMOGLOBIN  10.0*  10.1*  10.0*   HEMATOCRIT  30.0*  30.9*  30.7*   PLATELETCT  427  452*  468*   IRON   --   17*   --    FERRITIN   --   365.4*   --    TOTIRONBC   --   137*   --        Recent Labs      04/09/18   1830  04/10/18   0303  04/11/18   0814   WBC  23.4*  24.3*  22.9*   NEUTSPOLYS  83.10*  84.00*  83.40*   LYMPHOCYTES  10.20*  10.40*  9.90*   MONOCYTES  4.70  3.80  4.40   EOSINOPHILS  0.40  0.10  0.60   BASOPHILS  0.30  0.40  0.30   ASTSGOT  17  17  23   ALTSGPT  11  10  13   ALKPHOSPHAT  82  79  102*   TBILIRUBIN  0.4  0.4  0.4           Assessment/Plan       #Osteomyelitis  - Transferred from [place], with septic arthritis underwent I&D on 4/9    - WBC of 23.4 on admission   - Patient has been afebrile and has not shown any signs of infection since I&D  - Blood cultures are negative   - Urine culture negative   - ID review of gram stain showed multiple intracellular gram negative diplococci consistent with moraxella    Plan:  - ID recommend switching to amp/sulbactam iv 1.5 grams every 6 hours after review of gram stain  - Contact precautions until cultures are confirmed  - ID recommends PICC line due to negative blood cultures   - PT/OT consult     #Anemia  - Hb of 10 on admission   - Patient reports bright red blood with BM prior to admission   - Previous colonscopy showed benign poylps      Plan:  - Consider Iron panel to determine etiology of anemia  - Plan to transfuse if Hb<7      #Rectal pain  - Patient reported multiple days of 10/10 pain    - Pain localized to rectum    - Severe pain with bowel movements    - No pain over incision      Plan:  - Patient instructed to ask for pain medication if needed   - Has received multiple doses of oxycodone 10mg in the last 24 hours  - Continue to monitor and control pain   -  Pain control per orthopaedics with:              - scheduled acetaminophen PO 650mg Q6              - oxycodone 5 or 10mg PO Q3 PRN               - hydromorphone 0.5mg IV Q3 PRN      #Constipation   - Patient has problems with constipation chronically   - States that she takes stool softeners at home which seem to help     Plan:  - Continue to monitor bowel movements      #Tachycardia with frequent PACs  - Patient has intermittent tachycardia with PACs  - Previously noted on EKGs on 4/10  - 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

## 2018-04-12 NOTE — PROGRESS NOTES
Internal Medicine Interval Note  Note Author: Jesus Escobar M.D.     Name Lizette Means     3/8/1931   Age/Sex 87 y.o. female   MRN [id number]   Code Status FULL     After 5PM or if no immediate response to page, please call for cross-coverage  Attending/Team: /Mary  See Patient List for primary contact information  Call [phone] to page    1st Call - Day Intern (R1):   Narciso  2nd Call - Day Sr. Resident (R2/R3):   Escobar         Reason for interval visit  (Principal Problem)   Sepsis (CMS-HCC), left hip abscess with joint destruction.     Interval Problem Daily Status Update  (24 hours)   Left hip looks okay. PT/OT ordered per Ortho team's recs.   Dr. Fraire saw the patient, recommends to change antibiotics to Unasyn. So far, no growth on culture.   Still some anal pain. Stool studies are pending.     ROS  Constitutional: Denies fevers.  Eyes: Denies changes in vision, no eye pain  Ears/Nose/Throat/Mouth: Denies nasal congestion or sore throat   Cardiovascular: Denies chest pain or palpitations   Respiratory: Denies shortness of breath , Denies cough  Gastrointestinal/Hepatic: Some loose stool, pain in anal area.   Genitourinary: Denies bladder dysfunction, dysuria or frequency  Musculoskeletal/Rheum: Minimal left side hip pain if not moving.   Skin/Breast: Denies rash   Neurological: Denies headache. Denied focal weakness/parasthesias  Psychiatric: No complaints  All other systems were reviewed and are negative (AMA/CMS criteria)    Consultants/Specialty  Dr. Patel, ortho  Dr. Fraire ID    Disposition  Inpatient, planned to go SNF next week.     Quality Measures  Quality-Core Measures  On colunga   Wound vac drain  Change       Physical Exam       Vitals:    18 1600 18 1955 18 0435 18 0800   BP: 136/69 137/68 140/60 105/52   Pulse: 82 94 95 99   Resp: 17 16 15 15   Temp: 36.1 °C (97 °F) 36.3 °C (97.3 °F) 36.8 °C (98.3 °F) 36.6 °C (97.9 °F)   SpO2: 92% 91% 94% 96%    Weight:       Height:         Body mass index is 20.98 kg/m².    Oxygen Therapy:  Pulse Oximetry: 96 %, O2 (LPM): 0, O2 Delivery: None (Room Air)    Physical Exam  HEENT: grossly normal   Cardiovascular: Normal heart rate, Normal rhythm   Lungs: Respiratory effort is normal. Normal breath sounds  Abdomen: Bowel sounds normal, Soft, No tenderness  Skin: No erythema, No rash  Left hip operation site with drain. Surrounding soft tissue looks stable. No fluid accumulation found by physical exam. No overt redness.   Hemorrhoid noted, but no evidence of bleeding or thrombosis at this moment.   Neurologic: Alert & oriented x 3, Normal motor function, Normal sensory function, No focal deficits noted, cranial nerves II through XII are normal  PSY: stable mood.   Other systems also examined, grossly normal.       Lab Data Review:         4/12/2018  3:26 PM    Recent Labs      04/09/18   1830  04/10/18   0303  04/11/18   0814   SODIUM  135  135  137   POTASSIUM  3.7  3.8  3.5*   CHLORIDE  102  103  102   CO2  23  24  27   BUN  32*  30*  26*   CREATININE  1.06  0.95  0.85   MAGNESIUM   --    --   2.1   PHOSPHORUS   --    --   2.3*   CALCIUM  7.9*  8.3*  8.1*       Recent Labs      04/09/18   1830  04/10/18   0303  04/11/18   0814   ALTSGPT  11  10  13   ASTSGOT  17  17  23   ALKPHOSPHAT  82  79  102*   TBILIRUBIN  0.4  0.4  0.4   GLUCOSE  75  75  100*       Recent Labs      04/09/18   1830  04/10/18   0303  04/11/18   0814   RBC  3.30*  3.40*  3.40*   HEMOGLOBIN  10.0*  10.1*  10.0*   HEMATOCRIT  30.0*  30.9*  30.7*   PLATELETCT  427  452*  468*   IRON   --   17*   --    FERRITIN   --   365.4*   --    TOTIRONBC   --   137*   --        Recent Labs      04/09/18   1830  04/10/18   0303  04/11/18   0814   WBC  23.4*  24.3*  22.9*   NEUTSPOLYS  83.10*  84.00*  83.40*   LYMPHOCYTES  10.20*  10.40*  9.90*   MONOCYTES  4.70  3.80  4.40   EOSINOPHILS  0.40  0.10  0.60   BASOPHILS  0.30  0.40  0.30   ASTSGOT  17  17  23   ALTSGPT  11   10  13   ALKPHOSPHAT  82  79  102*   TBILIRUBIN  0.4  0.4  0.4           Assessment/Plan     * Sepsis (CMS-[facility])- (present on admission)   Assessment & Plan    This is sepsis (without associated acute organ dysfunction). SIRS 2/4 w tachycardia, leukocytosis on admission. Persistent. Source: left hip arthritis/osteomyelitis vs perirectal abscess. S/p sepsis protocol w abx, IVF.  - Vanco+Ceftriaxone changed to Unasyn on 04/12.         Osteomyelitis (CMS-HCC)- (present on admission)   Assessment & Plan    Identified on CT scan, no MRI available. Transferred from [facility] for Left femoral head fracture & dislocation associated with surrounding abscess that has spread to rectum which were found on  Imaging study. ED physician consulted Dr. Patel (Ortho) & pt was taken to OR urgently. Pt did not have any implants in the effected area.  - Vanco+Ceftriaxone changed to Unasyn on 04/12.   - continue postoperative pain control with scheduled acetaminophen, prn oxycodone, IV dilaudid for breakthrough  - continue vitamin D        Closed left hip fracture, sequela- (present on admission)   Assessment & Plan    GLF 3 days prior to admission, landed on left side. S/p I&D left hip on 4/9/18. No implants in left hip. Gram stain w some WBCs, rare gram pos cocci. Cultures NGTD.  - Vancomycin, ceftriaxone (04/09-04/12) changed to Uansyn (04/12-->)  - continue pain control with scheduled acetaminophen, prn oxycodone, IV hydromorphone for breakthrough  - supplement vitamin D   - discontinue colunga, bladder scan protocol  - on Lovenox for DVT ppx        Iron deficiency anemia due to chronic blood loss- (present on admission)   Assessment & Plan    H/H 10/30 on admit and stable. History of Hemorrhoids. Bright red blood in the toilet with every bowel movement, no black stool. Pt had colonoscopy in the past which showed benign polyps. FOBT moderate, suspect chronic blood loss component from GI bleed vs perirectal abscess. Not acute  blood loss due to surgery, as H/H stable since preop. Iron deficient, % saturation low, IV iron replacement started on 4/10.   - daily CBC   - Will transfuse if Hb < 7  - continue IV iron  - consider PPI, GI consult if decreasing H/H or persistent FOBT positive after wound controlled        Debility- (present on admission)   Assessment & Plan    Age 87, admitted for Left femoral head fracture & dislocation associated with surrounding abscess that has spread to rectum. GLF 3 weeks ago resulting in patient becoming bed bound. Pt used wheelchair to ambulate at baseline.  - continue vitamin D supplementation  - continue pressure ulcer prevention protocol   - PT/OT pending.         Fall from ground level- (present on admission)   Assessment & Plan    Multiple ground level falls for the last month & 3 weeks ago, she had a ground level fall & landed on her left side  - Vitamin D started   - PT/OT as tolerated        Hypertension- (present on admission)   Assessment & Plan    On lisinopril & Chlorthiazide at home. Normotensive this admission.  - hold antihypertensives for BP support in the setting of sepsis         Dementia- (present on admission)   Assessment & Plan    - chronic   - will CTM

## 2018-04-12 NOTE — CARE PLAN
Problem: Safety  Goal: Will remain free from falls  Outcome: PROGRESSING AS EXPECTED  Bed in the lowest position, call light within reach.    Problem: Pain Management  Goal: Pain level will decrease to patient's comfort goal  Prn pain medication administered.

## 2018-04-12 NOTE — DISCHARGE PLANNING
Anticipated Discharge Disposition: SNF    Action: [facility] [id number]    Barriers to Discharge: None    Plan: DC to SNF when medically clear

## 2018-04-12 NOTE — THERAPY
"Physical Therapy Treatment completed.   Bed Mobility:  Supine to Sit: Minimal Assist  Transfers: Sit to Stand: Unable to Participate  Gait: Level Of Assist: Unable to Participate with Front-Wheel Walker       Plan of Care: Will benefit from Physical Therapy 3 times per week  Discharge Recommendations: Equipment: Will Continue to Assess for Equipment Needs.     See \"Rehab Therapy-Acute\" Patient Summary Report for complete documentation.     Pt presenting w/ decreased activity tolerance. Pt stating \"I'm just so weak and this pain never goes away.\" Pt appears to have the strength to perform most mobility however is very anxious and requires a lot of encouragement. Pt was able to roll independently to get comfortable in bed however stated she couldn't when asked to perform for bed mobility. Pt was able to maintain dynamic sitting balance however declined seated scooting for transfer practice. Pt stating her daughter does not help her at all at home and is currently unable to perform mobility independently. As per initial eval, pt will benefit from post acute therapy.  "

## 2018-04-12 NOTE — PROGRESS NOTES
Patient alert and oriented x 4.  Requested bedpan several times all night. Complained of pain to rectum. Medicated with prn pain medication.

## 2018-04-12 NOTE — THERAPY
"Occupational Therapy Treatment completed with focus on ADLs, ADL transfers and patient education.  Functional Status:  Pt seen for OT tx. Supine > sit min A. Pt very fearful of falling and anxious about OOB activity. Pt given emotional support and encouragement. Pt required min A for LLE off the bed but was able to move while in bed and off the bed towards end of sitting up. Pt unable to scoot to EOB and required mod A for seated scooting. Pt c/o pain during session and declined further ADLs and OOB activity. Min A rolling in bed to Rt and Lt. Max A for pericare.   Plan of Care: Will benefit from Occupational Therapy 3 times per week  Discharge Recommendations:  Equipment Will Continue to Assess for Equipment Needs.    See \"Rehab Therapy-Acute\" Patient Summary Report for complete documentation.   "

## 2018-04-12 NOTE — PROGRESS NOTES
Internal Medicine Interval Note  Note Author: Zo Stuart M.D.     Name Lizette Means     3/8/1931   Age/Sex 87 y.o. female   MRN [id number]   Code Status DNR     After 5PM or if no immediate response to page, please call for cross-coverage  Attending/Team: Dr. Morales /Mary See Patient List for primary contact information  Call [phone] to page    1st Call - Day Intern (R1):   Dr. Stuart 2nd Call - Day Sr. Resident (R2/R3):   Dr. Escobar         Reason for interval visit  (Principal Problem)   Closed left hip fracture, sequela    Interval Problem Daily Status Update  (24 hours)   Afebrile, mildly tachycardic. Sinus w frequent PACs, per bedside monitor.  Pain well controlled.  S/p I&D left hip  niall. Gram stain w many WBCs, rare gram pos cocci. Cultures NGTD. ID consulted, recommended continuing IV vancomycin, ceftriaxone.  Neurovascularly intact. No evidence of bleeding. Starting Lovenox today for DVT ppx.  Discontinue colunga today.      Review of Systems   Constitutional: Negative for chills and fever.   HENT: Negative for congestion and sore throat.    Eyes: Negative for blurred vision and double vision.   Respiratory: Negative for cough, hemoptysis and shortness of breath.    Cardiovascular: Negative for chest pain and palpitations.   Gastrointestinal: Negative for nausea and vomiting.   Genitourinary: Negative for dysuria and urgency.   Musculoskeletal: Negative for back pain and neck pain.   Skin: Negative for itching and rash.   Neurological: Negative for dizziness, tremors and seizures.   Endo/Heme/Allergies: Negative for environmental allergies. Does not bruise/bleed easily.       Consultants/Specialty  Orthopedic surgery  Infectious disease    Disposition  Inpatient for treatment of septic arthritis    Quality Measures  Quality-Core Measures   Reviewed items::  EKG reviewed, Medications reviewed, Labs reviewed and Radiology images reviewed  Colunga catheter::  One or Two Days Post Surgery  (Day of Surgery being Day 0)  DVT prophylaxis pharmacological::  Enoxaparin (Lovenox)  DVT prophylaxis - mechanical:  SCDs  Ulcer Prophylaxis::  Not indicated  Antibiotics:  Treating active infection/contamination beyond 24 hours perioperative coverage  Assessed for rehabilitation services:  Patient was assess for and/or received rehabilitation services during this hospitalization      Physical Exam     Vitals:    04/11/18 0400 04/11/18 0800 04/11/18 1600 04/11/18 1955   BP: 116/59 112/65 136/69 137/68   Pulse: 94 100 82 94   Resp: 17 17 17 16   Temp: 36.3 °C (97.4 °F) 37.1 °C (98.7 °F) 36.1 °C (97 °F) 36.3 °C (97.3 °F)   SpO2: 94% 90% 92% 91%   Weight:       Height:         Body mass index is 20.98 kg/m².    Oxygen Therapy:  Pulse Oximetry: 91 %, O2 (LPM): 0, O2 Delivery: None (Room Air)    Physical Exam   Constitutional: She is oriented to person, place, and time and well-developed, well-nourished, and in no distress.   HENT:   Head: Normocephalic and atraumatic.   Mouth/Throat: No oropharyngeal exudate.   Eyes: Conjunctivae are normal. Pupils are equal, round, and reactive to light. No scleral icterus.   Neck: Normal range of motion. Neck supple. No thyromegaly present.   Cardiovascular: Normal rate, regular rhythm, normal heart sounds and intact distal pulses.  Exam reveals no gallop and no friction rub.    No murmur heard.  Pulmonary/Chest: Effort normal and breath sounds normal. No respiratory distress. She has no wheezes. She has no rales.   Abdominal: Soft. Bowel sounds are normal. She exhibits no distension. There is no tenderness. There is no guarding.   Musculoskeletal: Normal range of motion. She exhibits no edema.   LLE neurovascularly intact with 5/5 strength, DP/PT pulses present. Sterile dressing to hip c/d/i, surrounding skin nonerythematous, nonedematous.   Lymphadenopathy:     She has no cervical adenopathy.   Neurological: She is alert and oriented to person, place, and time. No cranial nerve  deficit.   Skin: Skin is warm and dry.   Psychiatric: Mood and affect normal.       Lab Data Review:     Recent Labs      04/09/18   1830  04/10/18   0303  04/11/18   0814   SODIUM  135  135  137   POTASSIUM  3.7  3.8  3.5*   CHLORIDE  102  103  102   CO2  23  24  27   BUN  32*  30*  26*   CREATININE  1.06  0.95  0.85   MAGNESIUM   --    --   2.1   PHOSPHORUS   --    --   2.3*   CALCIUM  7.9*  8.3*  8.1*       Recent Labs      04/09/18   1830  04/10/18   0303  04/11/18   0814   ALTSGPT  11  10  13   ASTSGOT  17  17  23   ALKPHOSPHAT  82  79  102*   TBILIRUBIN  0.4  0.4  0.4   GLUCOSE  75  75  100*       Recent Labs      04/09/18   1830  04/10/18   0303  04/11/18   0814   RBC  3.30*  3.40*  3.40*   HEMOGLOBIN  10.0*  10.1*  10.0*   HEMATOCRIT  30.0*  30.9*  30.7*   PLATELETCT  427  452*  468*   IRON   --   17*   --    FERRITIN   --   365.4*   --    TOTIRONBC   --   137*   --        Recent Labs      04/09/18   1830  04/10/18   0303  04/11/18   0814   WBC  23.4*  24.3*  22.9*   NEUTSPOLYS  83.10*  84.00*  83.40*   LYMPHOCYTES  10.20*  10.40*  9.90*   MONOCYTES  4.70  3.80  4.40   EOSINOPHILS  0.40  0.10  0.60   BASOPHILS  0.30  0.40  0.30   ASTSGOT  17  17  23   ALTSGPT  11  10  13   ALKPHOSPHAT  82  79  102*   TBILIRUBIN  0.4  0.4  0.4           Assessment/Plan     * Sepsis (CMS-HCC)- (present on admission)   Assessment & Plan    This is sepsis (without associated acute organ dysfunction). SIRS 2/4 w tachycardia, leukocytosis on admission. Persistent. Source: left hip arthritis/osteomyelitis vs perirectal abscess. S/p sepsis protocol w abx, IVF.  - continue vancomycin, ceftriaxone  - appreciate ID assistance  - continue to monitor        Osteomyelitis (CMS-HCC)- (present on admission)   Assessment & Plan    Identified on CT scan, no MRI available. Transferred from [facility] for Left femoral head fracture & dislocation associated with surrounding abscess that has spread to rectum which were found on  Imaging  study. ED physician consulted Dr. Patel (Ortho) & pt was taken to OR urgently. Pt did not have any implants in the effected area.  - Fall / seizure / aspiration precautions   - continue Vancomycin and ceftriaxone  - continue postoperative pain control with scheduled acetaminophen, prn oxycodone, IV dilaudid for breakthrough  - avoid NSAIDs due to bleeding risk  - appreciate ID recs  - continue vitamin D        Closed left hip fracture, sequela- (present on admission)   Assessment & Plan    GLF 3 days prior to admission, landed on left side. S/p I&D left hip on 4/9/18. No implants in left hip. Gram stain w many WBCs, rare gram pos cocci. Cultures NGTD.  - Fall/ seizure/ aspiration precautions   - continue Vancomycin, ceftriaxone  - contact precautions given risk of MRSA  - appreciate ID recommendations  - continue pain control with scheduled acetaminophen, prn oxycodone, IV hydromorphone for breakthrough  - supplement vitamin D   - discontinue colunga, bladder scan protocol  - start Lovenox for DVT ppx        Iron deficiency anemia due to chronic blood loss- (present on admission)   Assessment & Plan    H/H 10/30 on admit and stable. History of Hemorrhoids. Bright red blood in the toilet with every bowel movement, no black stool. Pt had colonoscopy in the past which showed benign polyps. FOBT moderate, suspect chronic blood loss component from GI bleed vs perirectal abscess. Not acute blood loss due to surgery, as H/H stable since preop. Iron deficient, % saturation low, IV iron replacement started on 4/10.   - daily CBC   - Will transfuse if Hb < 7  - continue IV iron  - consider PPI, GI consult if decreasing H/H or persistent FOBT positive after wound controlled        Constipation- (present on admission)   Assessment & Plan    Chronic constipation. May be exacerbated by opiate pain medications.  - continue bowel protocol   - encourage bowel regimen         Debility- (present on admission)   Assessment & Plan     Age 87, admitted for Left femoral head fracture & dislocation associated with surrounding abscess that has spread to rectum. GLF 3 weeks ago resulting in patient becoming bed bound. Pt used wheelchair to ambulate at baseline.  - continue vitamin D supplementation  - continue pressure ulcer prevention protocol   - PT/OT as tolerated, appreciate discharge recs        Fall from ground level- (present on admission)   Assessment & Plan    Multiple ground level falls for the last month & 3 weeks ago, she had a ground level fall & landed on her left side  - Vitamin D started   - PT/OT as tolerated        Hypertension- (present on admission)   Assessment & Plan    On lisinopril & Chlorthiazide at home. Normotensive this admission.  - hold antihypertensives for BP support in the setting of sepsis         Dementia- (present on admission)   Assessment & Plan    - chronic   - will CTM

## 2018-04-12 NOTE — PROGRESS NOTES
Pt. refuses q2 turns, educated the pt. on the importance of turning to avoid skin breakdown, pt. stated she does turn herself. Pt. avoids turning to her left side states she can not do it.

## 2018-04-12 NOTE — PROGRESS NOTES
87 year old female seen in f/u for destructive left hip abscess and osteomyelitis    HPI: This is a very nice 87-year-old patient with rheumatoid   arthritis, apparently fell and hit her left hip and has now developed a   rapidly progressive destructive osteomyelitis and abscess of her left hip.    Dr. Patel  2 days ago  drained  about 200 mL of pus and left a drain   in.  The patient is feeling a lot better and presently, she is on   broad-spectrum antibiotics.  she does c/o rectal discomfort, but states she has had hemorrhoidal pain there for years    . States is eating and bowels working. Does have Bull in     Meds- iv ceftriaxone and iv vancomycin     ROS- denies fever or chills,              No respiratory complaints             No rash     PHYSICAL EXAMINATION:  VITAL SIGNS:  She is afebrile.  Her blood pressure is about 115/70.  GENERAL:  She is alert, cooperative, and fully oriented   NECK:  Supple.  I find no lymph nodes in her neck.  LUNGS:  Anteriorly are clear.  HEART:  I do not appreciate any heart murmur.  ABDOMEN:  Soft and no masses.  SKIN:  Shows no rash.  EXTREMITIES:  She has a drain in the left hip and she has generalized edema of   the left leg and minimal erythema.  She has pulses in both feet.  NEUROLOGIC:  She is oriented x3.  PSYCHIATRIC:  Her mood is completely normal.   Results for ALEC WALKER (MRN [id number]) as of 4/11/2018 15:43  Results for ALEC WALKER (MRN [id number]) as of 4/12/2018 14:31   Ref. Range 4/9/2018 20:15 4/9/2018 21:42 4/9/2018 21:42 4/9/2018 21:42   Significant Indicator Unknown NEG . NEG NEG   Site Unknown PERIPHERAL Left Hip Abscess Left Hip Abscess Left Hip Abscess   Source Unknown BLD WND WND WND     Assess: hip abscess- all aerobic and anaerobic cultures are negative, However, detailed look at gram stain shows a few polys with multiple intracellular gram negative diplococci c/w moraxella. ? Seeded from a respiratory source, no evidence for staph/MRSA/enterics/ or  anaerobes/  REC: stop vancomycin and ceftriaxone. Switch to amp/sulbactam iv  1.5 grams every 6 hours. Will need extended course , when ready  For SNF can switch to daily ertapenem to complete 6 weeks of therapy

## 2018-04-12 NOTE — PROGRESS NOTES
"Pharmacy Kinetics 87 y.o. female on vancomycin day # 4 2018    Currently on Vancomycin 600mg iv q24hr (1400)     Indication for Treatment: Osteomyelitis, SSTI     Pertinent history per medical record: Admitted on 2018 for osteomyelitis of hip. Patient presents with left hip abscess, surgery preformed I&D on 18 removed about 200 mL of purulent fluid . Empiric antibiotics have started.  Infectious disease will be consulted in the morning.      Other antibiotics: Ceftriaxone      Allergies: Patient has no known allergies.      List concerns for renal function: BUN/SCr ratio > 20:1, Hypoalbuminemia, Age      Pertinent cultures to date:   4/10/18: Urine cx = No growth at 24 hrs   18: L hip abscess wound Cx = In process, no growth at 48 hours. Gram stain = many gram + cocci   18: L hip abscess anaerobic Cx = In process   18: PBC x one =  NGTD     Recent Labs      18   1830  04/10/18   0303  18   0814   WBC  23.4*  24.3*  22.9*   NEUTSPOLYS  83.10*  84.00*  83.40*     Recent Labs      18   1830  04/10/18   0303  18   0814   BUN  32*  30*  26*   CREATININE  1.06  0.95  0.85   ALBUMIN  2.2*  2.1*  2.3*     Recent Labs      18   1425   VANCOTROUGH  12.8     Intake/Output Summary (Last 24 hours) at 18 0849  Last data filed at 18 0815   Gross per 24 hour   Intake              240 ml   Output              900 ml   Net             -660 ml      Blood pressure 105/52, pulse 99, temperature 36.6 °C (97.9 °F), resp. rate 15, height 1.676 m (5' 6\"), weight 59 kg (130 lb), SpO2 96 %, not currently breastfeeding. Temp (24hrs), Av.4 °C (97.6 °F), Min:36.1 °C (97 °F), Max:36.8 °C (98.3 °F)      A/P   1. Vancomycin dose change: Not indicated, continue.   2. Next vancomycin level: Tomorrow,  at 1330   3. Goal trough: 12 - 16 mcg/mL   4. Comments: VS stable, Tmax 98.7 F with downward trending leukocytosis yesterday on CBC. No new CBC or BMP today, renal indices " stable yesterday. Trough level resulted yesterday afternoon at 12.8 mcg/mL drawn prior to 3rd total dose of vancomycin, likely prior to drug achieving steady state concentrations. Dose currently appropriate, no dose change indicated. Repeat trough level tomorrow Friday, April 13th at 1330 to check for accumulation and ensure patient remains within goal range as drug achieves steady state concentrations.  Cultures remain negative to date. Dr Fraire consulting - continue current therapy pending culture results.     Christina Vance, PharmD

## 2018-04-12 NOTE — PROGRESS NOTES
Attending Note:  I have personally evaluated and examined this patient and agree with the findings as documented in the resident note except as documented in this attending note.  I am actively involved in the patient's care.    Intern note will follow.  Left hip osteomyelitis and septic arthritis.  Micro negative to date.  Continue antibiotics per ID.  Started DVT prophy with lovenox.  Tachycardia assessed with bedside cardiac monitor.  Sinus with frequent PACs

## 2018-04-12 NOTE — ASSESSMENT & PLAN NOTE
This is sepsis (without associated acute organ dysfunction). SIRS 2/4 w tachycardia, leukocytosis on admission. Persistent. Source: left hip arthritis/osteomyelitis vs perirectal abscess. S/p sepsis protocol w abx, IVF. Cultures with NG. Vanco+Ceftriaxone changed to Unasyn on 04/12. PICC placed 4/14, functioning well. Persistent leukocytosis, tachycardia, and anal pain despite IV abx are concerning for perirectal abscess.  Gram -ve diplococci, likely MOraxella per Dr. Fraire  WBC 19 on 4/18/18, (was 15.7 on 4/17, was 19 on 4/16)  - continue Unasyn whiile at [facility], switch to Ertapenem to complete 6 weeks IV antibiotics whe transferred to SNF per ID recs  - f/u cultures  - ID on board, appreciate recs  - Discharge to SNF   - will need 6 weeks of IV antibiotics in total per Dr. Fraire, needs Ertapenem daily at [facility] (IV antibiotics first started on 9th April0

## 2018-04-13 ENCOUNTER — APPOINTMENT (OUTPATIENT)
Dept: RADIOLOGY | Facility: MEDICAL CENTER | Age: 83
DRG: 854 | End: 2018-04-13
Attending: INTERNAL MEDICINE
Payer: MEDICARE

## 2018-04-13 LAB
ANION GAP SERPL CALC-SCNC: 9 MMOL/L (ref 0–11.9)
BACTERIA WND AEROBE CULT: NORMAL
BASOPHILS # BLD AUTO: 0.5 % (ref 0–1.8)
BASOPHILS # BLD: 0.09 K/UL (ref 0–0.12)
BUN SERPL-MCNC: 15 MG/DL (ref 8–22)
CALCIUM SERPL-MCNC: 7.8 MG/DL (ref 8.5–10.5)
CHLORIDE SERPL-SCNC: 103 MMOL/L (ref 96–112)
CO2 SERPL-SCNC: 23 MMOL/L (ref 20–33)
CREAT SERPL-MCNC: 0.61 MG/DL (ref 0.5–1.4)
EOSINOPHIL # BLD AUTO: 0.11 K/UL (ref 0–0.51)
EOSINOPHIL NFR BLD: 0.6 % (ref 0–6.9)
ERYTHROCYTE [DISTWIDTH] IN BLOOD BY AUTOMATED COUNT: 45.3 FL (ref 35.9–50)
GLUCOSE SERPL-MCNC: 102 MG/DL (ref 65–99)
GRAM STN SPEC: NORMAL
HCT VFR BLD AUTO: 32.5 % (ref 37–47)
HGB BLD-MCNC: 10.4 G/DL (ref 12–16)
IMM GRANULOCYTES # BLD AUTO: 0.29 K/UL (ref 0–0.11)
IMM GRANULOCYTES NFR BLD AUTO: 1.5 % (ref 0–0.9)
LYMPHOCYTES # BLD AUTO: 2.27 K/UL (ref 1–4.8)
LYMPHOCYTES NFR BLD: 12 % (ref 22–41)
MAGNESIUM SERPL-MCNC: 1.8 MG/DL (ref 1.5–2.5)
MCH RBC QN AUTO: 29.3 PG (ref 27–33)
MCHC RBC AUTO-ENTMCNC: 32 G/DL (ref 33.6–35)
MCV RBC AUTO: 91.5 FL (ref 81.4–97.8)
MONOCYTES # BLD AUTO: 1.27 K/UL (ref 0–0.85)
MONOCYTES NFR BLD AUTO: 6.7 % (ref 0–13.4)
NEUTROPHILS # BLD AUTO: 14.9 K/UL (ref 2–7.15)
NEUTROPHILS NFR BLD: 78.7 % (ref 44–72)
NRBC # BLD AUTO: 0 K/UL
NRBC BLD-RTO: 0 /100 WBC
PLATELET # BLD AUTO: 472 K/UL (ref 164–446)
PMV BLD AUTO: 9.2 FL (ref 9–12.9)
POTASSIUM SERPL-SCNC: 4.4 MMOL/L (ref 3.6–5.5)
RBC # BLD AUTO: 3.55 M/UL (ref 4.2–5.4)
SIGNIFICANT IND 70042: NORMAL
SITE SITE: NORMAL
SODIUM SERPL-SCNC: 135 MMOL/L (ref 135–145)
SOURCE SOURCE: NORMAL
WBC # BLD AUTO: 18.9 K/UL (ref 4.8–10.8)

## 2018-04-13 PROCEDURE — 99232 SBSQ HOSP IP/OBS MODERATE 35: CPT | Mod: GC | Performed by: INTERNAL MEDICINE

## 2018-04-13 PROCEDURE — A9270 NON-COVERED ITEM OR SERVICE: HCPCS | Performed by: STUDENT IN AN ORGANIZED HEALTH CARE EDUCATION/TRAINING PROGRAM

## 2018-04-13 PROCEDURE — 83735 ASSAY OF MAGNESIUM: CPT

## 2018-04-13 PROCEDURE — 700102 HCHG RX REV CODE 250 W/ 637 OVERRIDE(OP): Performed by: STUDENT IN AN ORGANIZED HEALTH CARE EDUCATION/TRAINING PROGRAM

## 2018-04-13 PROCEDURE — A9270 NON-COVERED ITEM OR SERVICE: HCPCS | Performed by: ORTHOPAEDIC SURGERY

## 2018-04-13 PROCEDURE — 700102 HCHG RX REV CODE 250 W/ 637 OVERRIDE(OP): Performed by: INTERNAL MEDICINE

## 2018-04-13 PROCEDURE — 80048 BASIC METABOLIC PNL TOTAL CA: CPT

## 2018-04-13 PROCEDURE — 85025 COMPLETE CBC W/AUTO DIFF WBC: CPT

## 2018-04-13 PROCEDURE — 770021 HCHG ROOM/CARE - ISO PRIVATE

## 2018-04-13 PROCEDURE — 700102 HCHG RX REV CODE 250 W/ 637 OVERRIDE(OP): Performed by: ORTHOPAEDIC SURGERY

## 2018-04-13 PROCEDURE — 700105 HCHG RX REV CODE 258: Performed by: INTERNAL MEDICINE

## 2018-04-13 PROCEDURE — A9270 NON-COVERED ITEM OR SERVICE: HCPCS | Performed by: INTERNAL MEDICINE

## 2018-04-13 PROCEDURE — 700111 HCHG RX REV CODE 636 W/ 250 OVERRIDE (IP): Performed by: INTERNAL MEDICINE

## 2018-04-13 PROCEDURE — 36415 COLL VENOUS BLD VENIPUNCTURE: CPT

## 2018-04-13 RX ADMIN — ACETAMINOPHEN 650 MG: 325 TABLET, FILM COATED ORAL at 05:57

## 2018-04-13 RX ADMIN — OXYCODONE HYDROCHLORIDE 5 MG: 5 TABLET ORAL at 22:47

## 2018-04-13 RX ADMIN — DIBASIC SODIUM PHOSPHATE, MONOBASIC POTASSIUM PHOSPHATE AND MONOBASIC SODIUM PHOSPHATE 1 TABLET: 852; 155; 130 TABLET ORAL at 10:46

## 2018-04-13 RX ADMIN — ENOXAPARIN SODIUM 40 MG: 100 INJECTION SUBCUTANEOUS at 09:54

## 2018-04-13 RX ADMIN — VITAMIN D, TAB 1000IU (100/BT) 1000 UNITS: 25 TAB at 09:53

## 2018-04-13 RX ADMIN — ACETAMINOPHEN 650 MG: 325 TABLET, FILM COATED ORAL at 10:46

## 2018-04-13 RX ADMIN — AMITRIPTYLINE HYDROCHLORIDE 25 MG: 25 TABLET, FILM COATED ORAL at 22:27

## 2018-04-13 RX ADMIN — OXYCODONE HYDROCHLORIDE 5 MG: 5 TABLET ORAL at 14:05

## 2018-04-13 RX ADMIN — AMPICILLIN SODIUM AND SULBACTAM SODIUM 1.5 G: 1; .5 INJECTION, POWDER, FOR SOLUTION INTRAMUSCULAR; INTRAVENOUS at 03:01

## 2018-04-13 RX ADMIN — DIBASIC SODIUM PHOSPHATE, MONOBASIC POTASSIUM PHOSPHATE AND MONOBASIC SODIUM PHOSPHATE 1 TABLET: 852; 155; 130 TABLET ORAL at 05:57

## 2018-04-13 RX ADMIN — ACETAMINOPHEN 650 MG: 325 TABLET, FILM COATED ORAL at 00:39

## 2018-04-13 RX ADMIN — DIBASIC SODIUM PHOSPHATE, MONOBASIC POTASSIUM PHOSPHATE AND MONOBASIC SODIUM PHOSPHATE 1 TABLET: 852; 155; 130 TABLET ORAL at 00:39

## 2018-04-13 ASSESSMENT — ENCOUNTER SYMPTOMS
PALPITATIONS: 0
TREMORS: 0
BRUISES/BLEEDS EASILY: 0
NAUSEA: 0
CHILLS: 0
SORE THROAT: 0
COUGH: 0
NECK PAIN: 0
DOUBLE VISION: 0
DIARRHEA: 0
DIZZINESS: 0
SEIZURES: 0
FEVER: 0
HEMOPTYSIS: 0
BACK PAIN: 0
BLURRED VISION: 0
SHORTNESS OF BREATH: 0
VOMITING: 0

## 2018-04-13 ASSESSMENT — PAIN SCALES - GENERAL
PAINLEVEL_OUTOF10: 0
PAINLEVEL_OUTOF10: 3
PAINLEVEL_OUTOF10: 6

## 2018-04-13 ASSESSMENT — COGNITIVE AND FUNCTIONAL STATUS - GENERAL: TURNING FROM BACK TO SIDE WHILE IN FLAT BAD: UNABLE

## 2018-04-13 NOTE — CARE PLAN
Problem: Communication  Goal: The ability to communicate needs accurately and effectively will improve  Outcome: PROGRESSING AS EXPECTED  Verbalizes understanding to use call light for needs.

## 2018-04-13 NOTE — PROGRESS NOTES
"   Orthopaedic PA Progress Note    Interval changes: Did well overnight    ROS - Patient denies any new issues. No chest pain, dyspnea, or fever.  Pain well controlled.    Blood pressure 148/68, pulse 98, temperature 36.9 °C (98.5 °F), resp. rate 15, height 1.676 m (5' 6\"), weight 59 kg (130 lb), SpO2 99 %, not currently breastfeeding.    Patient seen and examined  No acute distress  Breathing non labored  RRR  Surgical dressing is clean, dry, and intact. Patient clearly fires tibialis anterior, EHL, and gastrocnemius/soleus. Sensation is intact to light touch throughout superficial peroneal, deep peroneal, tibial, saphenous, and sural nerve distributions. Strong and palpable 2+ dorsalis pedis and posterior tibial pulses with capillary refill less than 2 seconds. No lower leg tenderness or discomfort.  HV patent    Recent Labs      04/11/18   0814  04/13/18   0346   WBC  22.9*  18.9*   RBC  3.40*  3.55*   HEMOGLOBIN  10.0*  10.4*   HEMATOCRIT  30.7*  32.5*   MCV  90.3  91.5   MCH  29.4  29.3   MCHC  32.6*  32.0*   RDW  44.7  45.3   PLATELETCT  468*  472*   MPV  8.8*  9.2       Active Hospital Problems    Diagnosis   • Closed left hip fracture, sequela [S72.002S]     Priority: High   • Osteomyelitis (CMS-HCC) [M86.9]     Priority: High   • Fall from ground level [W18.30XA]     Priority: Medium   • Debility [R53.81]     Priority: Medium   • Iron deficiency anemia due to chronic blood loss [D50.0]     Priority: Medium   • Dementia [F03.90]     Priority: Low   • Hypertension [I10]     Priority: Low   • Sepsis (CMS-HCC) [A41.9]   • Pain [R52]       Assessment/Plan:  POD#4  Wt bearing status - as tolerated  PT/OT-initiated  Wound care: Ag++Mepilex intact  Drains - 100 mL/24 hr  Bull-out  Sutures/Staples out- 10-14 days post operatively  Antibiotics: continmue at Med service discretion  DVT Prophylaxis- TEDS/SCDs/Foot pumps.   Lovenox: Hold x 24h  Future Procedures - not anticipated  Case Coordination for Discharge " Planning - Disposition per [facility] White Team

## 2018-04-13 NOTE — CARE PLAN
Problem: Safety  Goal: Will remain free from falls  Outcome: PROGRESSING AS EXPECTED  Bed in the lowest position, call light within reach.         Problem: Pain Management  Goal: Pain level will decrease to patient's comfort goal  Outcome: PROGRESSING AS EXPECTED    Prn pain medication administered.

## 2018-04-13 NOTE — PROGRESS NOTES
"Patient seen and examined    Blood pressure 128/73, pulse (!) 110, temperature 36.9 °C (98.5 °F), resp. rate 16, height 1.676 m (5' 6\"), weight 59 kg (130 lb), SpO2 92 %, not currently breastfeeding.    Recent Labs      04/11/18   0814  04/13/18   0346   WBC  22.9*  18.9*   RBC  3.40*  3.55*   HEMOGLOBIN  10.0*  10.4*   HEMATOCRIT  30.7*  32.5*   MCV  90.3  91.5   MCH  29.4  29.3   MCHC  32.6*  32.0*   RDW  44.7  45.3   PLATELETCT  468*  472*   MPV  8.8*  9.2       No acute distress  Dressing clean dry and intact  Neurovascularly intact    POD#4    Plan:  DVT Prophylaxis- TEDS/SCDs  Weight Bearing Status-WBAT  PT/OT  Antibiotics: per ID  Case Coordination          "

## 2018-04-13 NOTE — PROGRESS NOTES
[facility] [place] [facility] [place]    Name:Lizette Means  : 3/8/1931  Age/Sex: 87 y.o.F  MRN:[id number]    Chief complaint/ reason for interval visit (Primary Diagnosis)   Left hip abscess  Left acetabular obliteration  Sepsis    Interval Problem Daily Status Update    Patient reports marked improvement in the perirectal pain, still no pain in the hip. Bowel movement normal, no other acute complaints.  Patient has difficult intravascular access, is scheduled for PICC line for long-term antibiotics      Review of Systems   Constitutional: Negative for chills and fever.   HENT: Negative for ear discharge and ear pain.    Respiratory: Negative for cough and shortness of breath.    Cardiovascular: Negative for chest pain and palpitations.   Gastrointestinal: Negative for diarrhea, nausea and vomiting.   Genitourinary: Negative for dysuria, frequency and urgency.   Skin: Negative for itching and rash.   All other systems reviewed and are negative.            Physical Exam   Physical Exam   Constitutional: She is oriented to person, place, and time and well-developed, well-nourished, and in no distress. No distress.   HENT:   Head: Normocephalic and atraumatic.   Right Ear: External ear normal.   Left Ear: External ear normal.   Eyes: Conjunctivae are normal. Pupils are equal, round, and reactive to light.   Neck: No tracheal deviation present. No thyromegaly present.   Cardiovascular: Normal rate and regular rhythm.    Pulmonary/Chest: Effort normal. No respiratory distress.   Abdominal: Soft. She exhibits no distension. There is no tenderness.   Musculoskeletal:   Left hip incision clean and well approximated. Drain in place draining serosanguineous fluid.   Neurological: She is alert and oriented to person, place, and time. GCS score is 15.   Skin: Skin is warm and dry. No rash noted. She is not diaphoretic. No erythema. No pallor.   Psychiatric: Affect and judgment normal.   Nursing  note and vitals reviewed.      Filed Vitals:    05/03/17 2000 05/03/17 2221 05/04/17 0400 05/04/17 0800   BP: 124/98 118/83 109/69 108/66   Pulse: 82 86 71 83   Temp: 36.8 °C (98.2 °F)  36.2 °C (97.2 °F) 36.7 °C (98 °F)   Resp: 16  16 12   Height:       Weight:       SpO2: 97%  93% 96%     Body mass index is 40.97 kg/(m^2).   Oxygen Therapy:  Pulse Oximetry: 96 %, O2 (LPM): 0, O2 Delivery: None (Room Air)        Lab Data Review:      5/4/2017  10:42 AM    Recent Labs      05/04/17   0920   SODIUM  135   POTASSIUM  3.7   CHLORIDE  102   CO2  22   BUN  19   CREATININE  0.79   CALCIUM  10.1       Recent Labs      05/04/17   0920   ALTSGPT  44   ASTSGOT  25   ALKPHOSPHAT  79   TBILIRUBIN  1.1   GLUCOSE  166*       Recent Labs      05/04/17   0920   RBC  6.01   HEMOGLOBIN  16.8   HEMATOCRIT  48.7   PLATELETCT  297       Recent Labs      05/04/17   0920   WBC  6.5   NEUTSPOLYS  89.60*   LYMPHOCYTES  9.20*   MONOCYTES  0.50   EOSINOPHILS  0.00   BASOPHILS  0.20   ASTSGOT  25   ALTSGPT  44   ALKPHOSPHAT  79   TBILIRUBIN  1.1           Assessment/Plan   Left hip abscess status post surgical washout, postoperative day #4  -Afebrile, healing as expected  -Rectal pain resolved  -PICC line when able  -Continue Unasyn every 6 hours, changed to daily ertapenem on discharge to skilled nursing for 6 total weeks antibiotic therapy.    Thank you for allowing us to participate in this patient's care.

## 2018-04-13 NOTE — CARE PLAN
Problem: Nutritional:  Goal: Achieve adequate nutritional intake  Patient will consume 50% of meals   Outcome: PROGRESSING AS EXPECTED  PO variable, but improving; last meal documented @ %.

## 2018-04-13 NOTE — DISCHARGE SUMMARY
"        Internal Medicine Discharge Summary  Note Author: Selene Joshi M.D.       Admit Date:  4/9/2018       Discharge Date:   4/18/2018    Service:   [facility] Internal Medicine White Team  Attending Physician(s):   Dr. Morales       Senior Resident(s):   Dr. Escobar  Todd Resident(s):   Dr. Stuart / Dr. Joshi      Primary Diagnosis:     Left hip fracture with abscess formation and joint destruction.   Anal pain, r/o soft tissue infection/inflammation secondary to the abscess.     Secondary Diagnoses:                Principal Problem:    Sepsis (CMS-HCC) POA: Yes  Active Problems:    Closed left hip fracture, sequela (Chronic) POA: Yes    Osteomyelitis (CMS-HCC) POA: Yes    Fall from ground level (Chronic) POA: Yes    Debility (Chronic) POA: Yes    Iron deficiency anemia due to chronic blood loss POA: Yes    Dementia (Chronic) POA: Yes    Hypertension (Chronic) POA: Yes    Pain POA: Yes  Resolved Problems:    Constipation (Chronic) POA: Yes        H/P written by Dr. Moses , Hospital Summary (Brief Narrative):       Pt is an 86 yo lady with PMHx of Dementia , Hypertension ( on lisinopril & Chlorthalidone ), Rheumatoid Arthritis, Hemorrhoids transferred from [facility] for\" Left femoral head fracture & dislocation associated with surrounding abscess that has spread to rectum which were found on  Imaging study. Pt is in pain but can provide some part of  the history. History was limited since the pt was taken to OR during the interview & also pt is a poor historian due to baseline dementia.   Pt had multiple ground level falls for the last month & 3 weeks ago, she had a ground level fall & landed on her left side. She denied no head injury at that aisha. After that she started to notice left hip pain & she became bed-bound because of the pain. Per pt, she had been laying in bed since that fall. Her left hip pain was mild 2-3/10 when it started after her fall 3 weeks ago but for the last 2 weeks, her left hip " "pain was getting worse, 9/10, constant dull pain, no radiation,aggravated by movements & somewhat relieved by Rest & medications (Tylenol & Tramadol ). Pt also complaints of rectal pain on & off which she relates the pain to chronic constipation & Hemorrhoids . But it has been constant & more severe x 2 weeks. Rectal pain is sharp, constant, aggravated by bowel movements and associated with nausea, constipation, bloody stools, decreased appetite & poor oral intake. Pt noticed a small amount of bright red blood in the toilet with every bowel movement, no black stool. Pt had colonoscopy in the past which showed benign polyps.  Pt denied fever, chills, chest pain, SOB, vomiting, diarrhea, cough, runny nose, dysuria , urinary frequency & urgency, changes in her mentation, syncope, LOC.      At [facility], pt was found to be septic & was given IVF & C3 1 g. Imaging study showed \"Left femoral head fracture & dislocation associated with surrounding abscess that has spread to rectum\". So the pt ws transferred to [facility] ED.      At ED, Vitals: /55   Pulse 87   Temp 37.1 °C (98.8 °F)   Resp 16   Ht 1.676 m (5' 6\")   Wt 59 kg (130 lb)   SpO2 97%   BMI 20.98 kg/m²    SIRS 1/4 by WBC 23.4. qSOFA 0.   H/H 10/30.   K+ 3.7.   BUN 32, Cr 1.06, eGFR 49.   EKG showed Normal sinus rhythm with HR of 82.  Blood Cx, Urine Cx & Urinalysis ordered.   ED physician consulted Dr. Patel (Ortho) & pt was taken to OR right away.   Admitted to Ortho Floor. Started on C3 & Vanco for possible osteomyelitis & skin/soft tissue abscess. Pt did not have any hardware in the effected area, Rifampin was held for now.   ID consult in AM.         Patient /Hospital Summary (Details -- Problem Oriented) :        Patient received urgent surgery on 04/10 for her left hip abscess, 200+cc pus was drained. The patient post operation recovery was uneventful. On Vanco/Ceftriaxone for 3 days (9th to 11th April) and then switched to Unasyn (from " 04/12/2018) after no growth on both pus culture and blood culture. Urine culture showed < 10,000 cfu yeasts , patient had no new urinary symptoms. PT/OT were consulted for further evaluation, to help with ambulation and discharge planning. ID was consulted, Dr. Fraire reviewed and felt that the Gram stain revealed some Gram -ve diplococci in keeping with probable Moraxella. Per ID recommendations, PICC line was sited on APril 14th for IV antibiotics. ID, Dr. Fraire recommended completing a 6 week course of antibiotics, and to start Ertapenem in SNF. Orthopedics have followed her during this current hospitalization and have advised weight bearing as tolerated.     For her intermittent anal pain and occasional rectal pain which was attributed to hemorrhoids in the past, digital rectal examinations on few occasions during this hospitalization did not reveal evidence of anal fissure, no tender swellings palpated, no purulence noted on glove, and patient tolerated DREs. Her CT was reviewed with multiple radiologists, no definite priyanka-rectal abscess noted, no definite fistula either. Her anal pain improved with antibiotics and rectal hydrocortisone suppositories. She was unable to lie still for a pelvic MRI. It was felt that the risks associated with benzodiazepines for the procedure would outweigh the benefits, hence MRI under sedation was abandoned. A repeat rectal exam prior to discharge did not show any evidence of abscess/purulence. WBC on the day of discharge was 19.     She is being transferred to SNF for completion of 6 weeks of IV antibiotics (started on 9th April 2018, she needs to be on daily Ertapenem when at SNF per ID), and for physical therapy and occupational therapy.            Osteomyelitis (CMS-[facility])   Assessment & Plan    Identified on CT scan, no MRI available. Transferred from [facility] for Left femoral head fracture & dislocation associated with surrounding abscess that has spread to rectum  which were found on  Imaging study. ED physician consulted Dr. Patel (Ortho) & pt was taken to OR urgently. Pt did not have any implants in the effected area. Vanco+Ceftriaxone changed to Unasyn on 04/12. PICC placed for long term abx.  - continue Unasyn  - appreciate ID recs, for ertapenem at discharge to SNF  - continue postoperative pain control with scheduled acetaminophen, prn oxycodone  - continue vitamin D  - PT/OT  - transfer to SNF today for PT, OT, IV antibiotics        Closed left hip fracture, sequela   Assessment & Plan    GLF 3 days prior to admission, landed on left side. S/p I&D left hip on 4/9/18. No implants in left hip. Gram stain w some WBCs, likely gram -ve diplo, likely moraxella per ID, Dr. Fraire. Cultures NGTD. Vancomycin, ceftriaxone (04/09-04/12) changed to Unasyn (04/12-->)  WBC down to 15.7  - continue Unasyn, convert to ertapenem at discharge to SNF per Dr. Fraire  - continue pain control with scheduled acetaminophen, prn oxycodone  - ortho on board  - supplement vitamin D   - oxycodone 5 mg PRN-8hrly for pain short course  - continue Lovenox for DVT ppx  - PT, OT   - Discharge to SNF tomorrow  - will need 6 weeks of IV antibiotics in total per Dr. Fraire, needs Ertapenem daily at [facility]        Iron deficiency anemia due to chronic blood loss   Assessment & Plan    H/H 10/30 on admit and stable. History of Hemorrhoids. Bright red blood in the toilet with every bowel movement, no black stool. Pt had colonoscopy in the past which showed benign polyps. FOBT moderate, suspect chronic blood loss component from GI bleed vs perirectal abscess. Not acute blood loss due to surgery, as H/H stable since preop. Iron deficient, % saturation low, IV iron replacement started on 4/10. S/p IV iron.  - daily CBC   - Will transfuse if Hb < 7  - consider PPI, GI consult if decreasing H/H or persistent FOBT positive after wound controlled        Debility   Assessment & Plan    Age 87, admitted for  Left femoral head fracture & dislocation associated with surrounding abscess that has spread to rectum. GLF 3 weeks ago resulting in patient becoming bed bound. Pt used wheelchair to ambulate at baseline.  - continue vitamin D supplementation  - continue pressure ulcer prevention protocol   - PT/OT   - transfer to SNF today         Fall from ground level   Assessment & Plan    Multiple ground level falls for the last month & 3 weeks ago, she had a ground level fall & landed on her left side  Pt unable to lie flat for MRI pelvis. Not a candidate for BZDP  Repeat HUONG today - no changes, FIT weakly positive, no pus / swelling / exquisite tenderess  - continue Vitamin D started   - PT/OT   - Discharge to SNF tomorrow  - will need 6 weeks of IV antibiotics in total per Dr. Fraire, needs Ertapenem daily at SNF        Constipation-resolved as of 4/12/2018   Assessment & Plan    Chronic constipation. May be exacerbated by opiate pain medications.  - continue bowel protocol   - encourage bowel regimen         Anal or rectal pain   Assessment & Plan    - rectal pain  - known hemorrhoids  - left hip abscess on CT on admission  - patient couldn't lie still and flat for MRI, not for ativan  - repeat HUONG 4/17/18 - no pus / swelling / exquisite tenderness  - not for sedation, MRI cancelled  - to SNF         * Sepsis (CMS-HCC)   Assessment & Plan    This is sepsis (without associated acute organ dysfunction). SIRS 2/4 w tachycardia, leukocytosis on admission. Persistent. Source: left hip arthritis/osteomyelitis vs perirectal abscess. S/p sepsis protocol w abx, IVF. Cultures with NG. Vanco+Ceftriaxone changed to Unasyn on 04/12. PICC placed 4/14, functioning well. Persistent leukocytosis, tachycardia, and anal pain despite IV abx are concerning for perirectal abscess.  Gram -ve diplococci, likely MOraxella per Dr. Fraire  WBC 19 on 4/18/18, (was 15.7 on 4/17, was 19 on 4/16)  - continue Unasyn rosalee at [facility], switch to Ertapenem  to complete 6 weeks IV antibiotics whe transferred to SNF per ID recs  - f/u cultures  - ID on board, appreciate recs  - Discharge to SNF   - will need 6 weeks of IV antibiotics in total per Dr. Fraire, needs Ertapenem daily at SNF (IV antibiotics first started on 9th April0        Hypertension   Assessment & Plan    On lisinopril & chlorthalidone at home. Normotensive this admission.  - held antihypertensives for BP support in the setting of sepsis         Dementia   Assessment & Plan    - chronic   - will CTM             Consultants:     Dr. Patel, Ortho  Dr. Fraire, ID    Procedures:        I& D left septic joint.     Imaging/ Testing:        IR-PICC LINE PLACEMENT > AGE 5   Final Result                  Ultrasound-guided PICC placement performed by qualified nursing staff as    above.          DX-CHEST-FOR PICC LINE Perform procedure in: Patient's Room   Final Result      1.  Interval placement of a left arm PICC line which projects in satisfactory position.      2.  Clear lung parenchyma.      DX-HIP-COMPLETE - UNILATERAL 2+ LEFT   Final Result         1. Interval postsurgical change in the left lateral hip with placement of the surgical drain.      OUTSIDE IMAGES-DX PELVIS   Final Result      OUTSIDE IMAGES-CT ABDOMEN /PELVIS   Final Result      MR-PELVIS-WITH & W/O AND SEQUENCES    (Results Pending)     Left hip 04/10 2018    1. Interval postsurgical change in the left lateral hip with placement of the surgical drain.    Also outside hospital ABD CT on 04/09.     Discharge Medications:         Medication Reconciliation: Completed       Medication List      START taking these medications      Instructions   bisacodyl 10 MG Supp  Commonly known as:  DULCOLAX   Insert 1 Suppository in rectum 1 time daily as needed (if magnesium hydroxide ineffective after 24 hours).  Dose:  10 mg     Cholecalciferol 1000 UNIT Tabs  Start taking on:  4/19/2018  Commonly known as:  VITAMIND D3   Take 1 Tab by mouth every  day.  Dose:  1000 Units     docusate sodium 100 MG Caps   Take 100 mg by mouth 2 times a day.  Dose:  100 mg     enoxaparin 40 MG/0.4ML Soln inj  Start taking on:  4/19/2018  Commonly known as:  LOVENOX   Inject 40 mg as instructed every day.  Dose:  40 mg     hydrocortisone 25 MG Supp  Commonly known as:  ANUSOL-HC   Insert 1 Suppository in rectum every 12 hours as needed.  Dose:  25 mg     magnesium hydroxide 400 MG/5ML Susp  Commonly known as:  MILK OF MAGNESIA   Take 30 mL by mouth 1 time daily as needed (if sennosides, docusate, and/or polyethylene glycol 3350 ineffective or not ordered).  Dose:  30 mL     NS SOLN 100 mL with ampicillin/sulbactam 1.5 (1-0.5) g SOLR 1.5 g   Doctor's comments:  Needs a total of 6 weeks of IV antibiotics. Started on 9th April 2018 with ceftriaxone, converted to Unasyn on 12th April per ID recs. Dr. Fraire has recommended a full 6 week course of IV antibiotics, and to switch from Unasyn to Ertapenem at [facility].  1.5 g by Intravenous route every 6 hours.  Dose:  1.5 g     ondansetron 4 MG/2ML Soln injection  Commonly known as:  ZOFRAN   2 mL by Intravenous route every four hours as needed for Nausea (Nausea).  Dose:  4 mg     oxyCODONE immediate-release 5 MG Tabs  Commonly known as:  ROXICODONE   Take 1 Tab by mouth every 8 hours as needed for up to 15 days.  Dose:  5 mg     polyethylene glycol/lytes Pack  Commonly known as:  MIRALAX   Take 1 Packet by mouth 2 times a day as needed (if sennosides and/or docusate ineffective or not ordered).  Dose:  17 g     senna-docusate 8.6-50 MG Tabs  Commonly known as:  PERICOLACE or SENOKOT S   Take 1 Tab by mouth every day.  Dose:  1 Tab        CONTINUE taking these medications      Instructions   amitriptyline 25 MG Tabs  Commonly known as:  ELAVIL   Take 1 Tab by mouth every bedtime.  Dose:  25 mg     chlorthalidone 25 MG Tabs  Commonly known as:  HYGROTON   Doctor's comments:  Held during this admission due to borderline BP  Take 1 Tab by  mouth every day.  Dose:  25 mg     lisinopril 10 MG Tabs  Commonly known as:  PRINIVIL   Doctor's comments:  Held during this hospitalization at [facility] due to borderline BP  Take 1 Tab by mouth every day.  Dose:  10 mg     meclizine 25 MG Tabs  Commonly known as:  ANTIVERT   Take 1 Tab by mouth 3 times a day as needed.  Dose:  25 mg     potassium chloride SA 20 MEQ Tbcr  Commonly known as:  Kdur   Take 1 Tab by mouth 2 times a day.  Dose:  20 mEq        STOP taking these medications    ALEVE 220 MG tablet  Generic drug:  naproxen              Disposition:   SNF to continue her PT/OT and complete 6 weeks of IV antibiotics     Diet:   There is no new restriction from this admission.     Activity:  There is no new restriction from this admission.       Instructions:       The patient was instructed to return to the ER in the event of worsening symptoms. I have counseled the patient on the importance of compliance and the patient has agreed to proceed with all medical recommendations and follow up plan indicated above.   The patient understands that all medications come with benefits and risks. Risks may include permanent injury or death and these risks can be minimized with close reassessment and monitoring.        Primary Care Provider:    Geoff Collins M.D.  Discharge summary faxed to primary care provider:  Deferred  Copy of discharge summary given to the patient: Deferred      Follow up appointment details :      N/A for now, will need to be arranged when being discharged form SNF    Pending Studies:        None    Time spent on discharge day patient visit, preparing discharge paperwork and arranging for patient follow up.    Summary of follow up issues:   Need long-term PT/OT and to complete 6 weeks of IV antibiotics    Discharge Time (Minutes) :   60 mins   Hospital Course Type:  Inpatient Stay >2 midnights      Condition on Discharge     ______________________________________________________________________    Interval history/exam for day of discharge:    Remains in status quo. On IV antibiotics. PICC line in place sited on 4/14/18.   WBC increased to 19 today, from 15.7 on 4/17 and 19 on 4/16. Afebrile this am, Temp was 99.7F overnight.   Patient remains asymptomatic, denies fever / chills / rigors / sweating / unusual weakness / pain    Vitals:    04/12/18 1600 04/12/18 1900 04/13/18 0440 04/13/18 0800   BP: 123/68 133/61 128/73 148/68   Pulse: 72 91 (!) 110 98   Resp: 15 14 16 15   Temp: 36.4 °C (97.5 °F) 36.4 °C (97.6 °F) 36.9 °C (98.5 °F) 36.9 °C (98.5 °F)   SpO2: 95% 94% 92% 99%   Weight:       Height:         Weight/BMI: Body mass index is 20.98 kg/m².  Pulse Oximetry: 99 %, O2 (LPM): 0, O2 Delivery: None (Room Air)    General: frail, thin  CVS: S1, S2 normal, no murmur  PULM: clear to auscultation  ABD : soft, non-tender, not distended.   LEFT THIGH wound - clean, dressed.      Most Recent Labs:    Lab Results   Component Value Date/Time    WBC 18.9 (H) 04/13/2018 03:46 AM    RBC 3.55 (L) 04/13/2018 03:46 AM    HEMOGLOBIN 10.4 (L) 04/13/2018 03:46 AM    HEMATOCRIT 32.5 (L) 04/13/2018 03:46 AM    MCV 91.5 04/13/2018 03:46 AM    MCH 29.3 04/13/2018 03:46 AM    MCHC 32.0 (L) 04/13/2018 03:46 AM    MPV 9.2 04/13/2018 03:46 AM    NEUTSPOLYS 78.70 (H) 04/13/2018 03:46 AM    LYMPHOCYTES 12.00 (L) 04/13/2018 03:46 AM    MONOCYTES 6.70 04/13/2018 03:46 AM    EOSINOPHILS 0.60 04/13/2018 03:46 AM    BASOPHILS 0.50 04/13/2018 03:46 AM      Lab Results   Component Value Date/Time    SODIUM 135 04/13/2018 03:46 AM    POTASSIUM 4.4 04/13/2018 03:46 AM    CHLORIDE 103 04/13/2018 03:46 AM    CO2 23 04/13/2018 03:46 AM    GLUCOSE 102 (H) 04/13/2018 03:46 AM    BUN 15 04/13/2018 03:46 AM    CREATININE 0.61 04/13/2018 03:46 AM    CREATININE 0.9 02/24/2005 08:20 AM      Lab Results   Component Value Date/Time    ALTSGPT 13 04/11/2018 08:14 AM    ASTSGOT 23  04/11/2018 08:14 AM    ALKPHOSPHAT 102 (H) 04/11/2018 08:14 AM    TBILIRUBIN 0.4 04/11/2018 08:14 AM    ALBUMIN 2.3 (L) 04/11/2018 08:14 AM    GLOBULIN 3.4 04/11/2018 08:14 AM     No results found for: PROTHROMBTM, INR

## 2018-04-13 NOTE — PROGRESS NOTES
Hourly rounding, call bell within reach. Patient educated about plan of care, pain management, call bell use, and mobility. Patient demonstrates good bed mobility but refused to get up to chair today, would like to wait for daughter to bring in home wheelchair. Patient expresses fear of falling due to frequent falls in the past. Patient was alert and oriented x4 for me today. Patient voids in bed pan with just one assist, no bm today. Also complained of hemorrhoid pain, no bleeding noted, md notified. Hemovac in place to left hip without issue, dressing dry and intact. Patient requires frequent reminders to only use call bell and to not yell for help, verbalizes understanding but needs reinforcement. Possible PICC placed this afternoon. Peripheral IV's would not work long enough to give doses of IV antibiotics or IV iron after several attempts. Will monitor.

## 2018-04-13 NOTE — NON-PROVIDER
Internal Medicine Interval Note  Note Author: Devon Hernandez, Student     Name Lizette Means     3/8/1931   Age/Sex 87 y.o. female   MRN [id number]   Code Status DNR     After 5PM or if no immediate response to page, please call for cross-coverage  Attending/Team: Dr. Morales See Patient List for primary contact information  Call [phone] to page    1st Call - Day Intern (R1):   Dr. Stuart 2nd Call - Day Sr. Resident (R2/R3):   Dr. Escobar         Reason for interval visit  (Principal Problem)   Sepsis (CMS-[facility])       87 year old female who presented to [facility] as a transfer from [place] with osteomyelitis. She is s/p I&D of hip. Originally on vancomycin and ceftriaxone. Switched to amp/sulbactam IV 1.5 grams every 6 hours     Interval Problem Daily Status Update  (24 hours)   - No longer having any rectal pain  - Ortho is planning dressing change today  - Plan on PICC line  - Referral accepted at [facility]   - WBC has decreased to 18.9 from 22.9 yesterday  - PT states that patient would benefit from post acute therapy   - OT states that patient would benefit from further therapy at SNF     ROS  Constitutional: Negative for chills, fever and weight loss.   HENT: Negative for hearing loss.    Eyes: Negative for blurred vision and photophobia.   Respiratory: Negative for cough and shortness of breath.    Cardiovascular: Negative for chest pain, palpitations and leg swelling.   Gastrointestinal:. Negative for abdominal pain, constipation, heartburn, nausea and vomiting.   Genitourinary: Negative for dysuria and urgency.   Neurological: Negative for dizziness, loss of consciousness and headaches.     Consultants/Specialty  Ortho  I&D    Disposition  Inpatient for management of  placement of PICC line    Quality-Core Measures  Reviewed items::  EKG reviewed, Medications reviewed, Labs reviewed and Radiology images reviewed  Bull catheter:: No Bull    DVT prophylaxis pharmacological::  Enoxaparin (Lovenox)  and SCDs  Antibiotics:  Treating active infection/contamination beyond 24 hours perioperative coverage      Physical Exam       Vitals:    04/12/18 0800 04/12/18 1600 04/12/18 1900 04/13/18 0440   BP: 105/52 123/68 133/61 128/73   Pulse: 99 72 91 (!) 110   Resp: 15 15 14 16   Temp: 36.6 °C (97.9 °F) 36.4 °C (97.5 °F) 36.4 °C (97.6 °F) 36.9 °C (98.5 °F)   SpO2: 96% 95% 94% 92%   Weight:       Height:         Body mass index is 20.98 kg/m².    Oxygen Therapy:  Pulse Oximetry: 92 %, O2 (LPM): 0, O2 Delivery: None (Room Air)    Physical Exam  Constitutional: She is oriented to person, place, and time and well-developed, well-nourished.   HENT:   Head: Normocephalic and atraumatic.   Eyes: Pupils are equal, round, and reactive to light.   Neck: Normal range of motion. Neck supple.   Cardiovascular: Normal rate and regular rhythm.    Pulmonary/Chest: Effort normal and breath sounds normal.   Abdominal: She exhibits no distension and no mass. No tenderness or guarding   Musculoskeletal:   MSK exam limited by fractured left hip   Neurological: She is alert and oriented to person, place, and time.   Skin: Skin is warm and dry. No erythema.   Dressing clean, dry and intact     Lab Data Review:         4/13/2018  5:52 AM    Recent Labs      04/11/18   0814 04/13/18   0346   SODIUM  137  135   POTASSIUM  3.5*  4.4   CHLORIDE  102  103   CO2  27  23   BUN  26*  15   CREATININE  0.85  0.61   MAGNESIUM  2.1  1.8   PHOSPHORUS  2.3*   --    CALCIUM  8.1*  7.8*       Recent Labs      04/11/18   0814  04/13/18   0346   ALTSGPT  13   --    ASTSGOT  23   --    ALKPHOSPHAT  102*   --    TBILIRUBIN  0.4   --    GLUCOSE  100*  102*       Recent Labs      04/11/18   0814  04/13/18   0346   RBC  3.40*  3.55*   HEMOGLOBIN  10.0*  10.4*   HEMATOCRIT  30.7*  32.5*   PLATELETCT  468*  472*       Recent Labs      04/11/18 0814 04/13/18   0346   WBC  22.9*  18.9*   NEUTSPOLYS  83.40*  78.70*   LYMPHOCYTES  9.90*  12.00*   MONOCYTES  4.40  6.70    EOSINOPHILS  0.60  0.60   BASOPHILS  0.30  0.50   ASTSGOT  23   --    ALTSGPT  13   --    ALKPHOSPHAT  102*   --    TBILIRUBIN  0.4   --            Assessment/Plan       #Osteomyelitis  - Transferred from [place], with septic arthritis underwent I&D on 4/9               - WBC of 23.4 on admission              - Patient has been afebrile and has not shown any signs of infection since I&D  - Blood cultures are negative   - Urine culture negative   - ID review of gram stain showed multiple intracellular gram negative diplococci consistent with moraxella   - possible lung source  - PT states that patient would benefit from post acute therapy   - OT states that patient would benefit from further therapy at [facility]      Plan:  - ID recommend switching to amp/sulbactam iv 1.5 grams every 6 hours after review of gram stain   - Can switch to a 6 week course of ertapenem once PICC line placed and discharged to [facility]  - ID recommends PICC line due to negative blood cultures   - Plan to get into seated position as tolerated      #Iron deficiency anemia  - Hb of 10 on admission   - Previous colonscopy showed benign poylps      Plan:  - Plan to transfuse if Hb<7   - Iron sucrose 200mg IV daily for 5 doses     #Tachycardia with frequent PACs  - Patient has intermittent tachycardia with PACs  - Previously noted on EKGs on 4/10  - 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     #Rectal pain  - Patient reported multiple days of 10/10 pain               - Pain localized to rectum               - Severe pain with bowel movements               - No pain over leg incision   - Reported no pain on 4/13 at rest or with bowel movements     Plan:  - Patient instructed to ask for pain medication if needed  - Continue to monitor and control pain   - Pain control per orthopaedics with:              - scheduled acetaminophen PO 650mg Q6              - oxycodone 5 or 10mg PO  Q3 PRN               - hydromorphone 0.5mg IV Q3 PRN

## 2018-04-13 NOTE — CARE PLAN
Problem: Safety  Goal: Will remain free from injury  Outcome: PROGRESSING AS EXPECTED  Alarms on and functioning. Call light in reach. Tray table and personal items in reach.

## 2018-04-14 ENCOUNTER — APPOINTMENT (OUTPATIENT)
Dept: RADIOLOGY | Facility: MEDICAL CENTER | Age: 83
DRG: 854 | End: 2018-04-14
Attending: INTERNAL MEDICINE
Payer: MEDICARE

## 2018-04-14 LAB
ALBUMIN SERPL BCP-MCNC: 2.3 G/DL (ref 3.2–4.9)
ALBUMIN/GLOB SERPL: 0.7 G/DL
ALP SERPL-CCNC: 143 U/L (ref 30–99)
ALT SERPL-CCNC: 17 U/L (ref 2–50)
ANION GAP SERPL CALC-SCNC: 7 MMOL/L (ref 0–11.9)
APPEARANCE UR: CLEAR
AST SERPL-CCNC: 24 U/L (ref 12–45)
BACTERIA #/AREA URNS HPF: NEGATIVE /HPF
BACTERIA BLD CULT: NORMAL
BASOPHILS # BLD AUTO: 0.4 % (ref 0–1.8)
BASOPHILS # BLD: 0.08 K/UL (ref 0–0.12)
BILIRUB SERPL-MCNC: 0.5 MG/DL (ref 0.1–1.5)
BILIRUB UR QL STRIP.AUTO: NEGATIVE
BUN SERPL-MCNC: 11 MG/DL (ref 8–22)
CALCIUM SERPL-MCNC: 7.9 MG/DL (ref 8.5–10.5)
CHLORIDE SERPL-SCNC: 102 MMOL/L (ref 96–112)
CO2 SERPL-SCNC: 24 MMOL/L (ref 20–33)
COLOR UR: YELLOW
CREAT SERPL-MCNC: 0.65 MG/DL (ref 0.5–1.4)
EOSINOPHIL # BLD AUTO: 0.07 K/UL (ref 0–0.51)
EOSINOPHIL NFR BLD: 0.3 % (ref 0–6.9)
EPI CELLS #/AREA URNS HPF: NORMAL /HPF
ERYTHROCYTE [DISTWIDTH] IN BLOOD BY AUTOMATED COUNT: 43.7 FL (ref 35.9–50)
GLOBULIN SER CALC-MCNC: 3.3 G/DL (ref 1.9–3.5)
GLUCOSE SERPL-MCNC: 175 MG/DL (ref 65–99)
GLUCOSE UR STRIP.AUTO-MCNC: NEGATIVE MG/DL
HCT VFR BLD AUTO: 31.3 % (ref 37–47)
HGB BLD-MCNC: 10.6 G/DL (ref 12–16)
HYALINE CASTS #/AREA URNS LPF: NORMAL /LPF
IMM GRANULOCYTES # BLD AUTO: 0.37 K/UL (ref 0–0.11)
IMM GRANULOCYTES NFR BLD AUTO: 1.7 % (ref 0–0.9)
KETONES UR STRIP.AUTO-MCNC: NEGATIVE MG/DL
LEUKOCYTE ESTERASE UR QL STRIP.AUTO: NEGATIVE
LYMPHOCYTES # BLD AUTO: 2.55 K/UL (ref 1–4.8)
LYMPHOCYTES NFR BLD: 12 % (ref 22–41)
MAGNESIUM SERPL-MCNC: 1.7 MG/DL (ref 1.5–2.5)
MCH RBC QN AUTO: 30 PG (ref 27–33)
MCHC RBC AUTO-ENTMCNC: 33.9 G/DL (ref 33.6–35)
MCV RBC AUTO: 88.7 FL (ref 81.4–97.8)
MICRO URNS: ABNORMAL
MONOCYTES # BLD AUTO: 1.14 K/UL (ref 0–0.85)
MONOCYTES NFR BLD AUTO: 5.4 % (ref 0–13.4)
NEUTROPHILS # BLD AUTO: 17.07 K/UL (ref 2–7.15)
NEUTROPHILS NFR BLD: 80.2 % (ref 44–72)
NITRITE UR QL STRIP.AUTO: NEGATIVE
NRBC # BLD AUTO: 0 K/UL
NRBC BLD-RTO: 0 /100 WBC
PH UR STRIP.AUTO: 8 [PH]
PHOSPHATE SERPL-MCNC: 2.7 MG/DL (ref 2.5–4.5)
PLATELET # BLD AUTO: 528 K/UL (ref 164–446)
PMV BLD AUTO: 9.3 FL (ref 9–12.9)
POTASSIUM SERPL-SCNC: 3.6 MMOL/L (ref 3.6–5.5)
PROT SERPL-MCNC: 5.6 G/DL (ref 6–8.2)
PROT UR QL STRIP: NEGATIVE MG/DL
RBC # BLD AUTO: 3.53 M/UL (ref 4.2–5.4)
RBC # URNS HPF: NORMAL /HPF
RBC UR QL AUTO: ABNORMAL
SIGNIFICANT IND 70042: NORMAL
SITE SITE: NORMAL
SODIUM SERPL-SCNC: 133 MMOL/L (ref 135–145)
SOURCE SOURCE: NORMAL
SP GR UR STRIP.AUTO: 1.01
UROBILINOGEN UR STRIP.AUTO-MCNC: 0.2 MG/DL
WBC # BLD AUTO: 21.3 K/UL (ref 4.8–10.8)
WBC #/AREA URNS HPF: NORMAL /HPF

## 2018-04-14 PROCEDURE — 84100 ASSAY OF PHOSPHORUS: CPT

## 2018-04-14 PROCEDURE — 700111 HCHG RX REV CODE 636 W/ 250 OVERRIDE (IP): Performed by: INTERNAL MEDICINE

## 2018-04-14 PROCEDURE — 302255 BARRIER CREAM MOISTURE BAZA PROTECT (ZINC) 5OZ: Performed by: INTERNAL MEDICINE

## 2018-04-14 PROCEDURE — 81001 URINALYSIS AUTO W/SCOPE: CPT

## 2018-04-14 PROCEDURE — A9270 NON-COVERED ITEM OR SERVICE: HCPCS | Performed by: INTERNAL MEDICINE

## 2018-04-14 PROCEDURE — 700105 HCHG RX REV CODE 258: Performed by: INTERNAL MEDICINE

## 2018-04-14 PROCEDURE — 700102 HCHG RX REV CODE 250 W/ 637 OVERRIDE(OP): Performed by: INTERNAL MEDICINE

## 2018-04-14 PROCEDURE — 700101 HCHG RX REV CODE 250: Performed by: INTERNAL MEDICINE

## 2018-04-14 PROCEDURE — 700102 HCHG RX REV CODE 250 W/ 637 OVERRIDE(OP): Performed by: STUDENT IN AN ORGANIZED HEALTH CARE EDUCATION/TRAINING PROGRAM

## 2018-04-14 PROCEDURE — B548ZZA ULTRASONOGRAPHY OF SUPERIOR VENA CAVA, GUIDANCE: ICD-10-PCS | Performed by: INTERNAL MEDICINE

## 2018-04-14 PROCEDURE — 51798 US URINE CAPACITY MEASURE: CPT

## 2018-04-14 PROCEDURE — 87086 URINE CULTURE/COLONY COUNT: CPT

## 2018-04-14 PROCEDURE — 770021 HCHG ROOM/CARE - ISO PRIVATE

## 2018-04-14 PROCEDURE — A9270 NON-COVERED ITEM OR SERVICE: HCPCS | Performed by: ORTHOPAEDIC SURGERY

## 2018-04-14 PROCEDURE — 80053 COMPREHEN METABOLIC PANEL: CPT

## 2018-04-14 PROCEDURE — 83735 ASSAY OF MAGNESIUM: CPT

## 2018-04-14 PROCEDURE — 700102 HCHG RX REV CODE 250 W/ 637 OVERRIDE(OP): Performed by: ORTHOPAEDIC SURGERY

## 2018-04-14 PROCEDURE — 02HV33Z INSERTION OF INFUSION DEVICE INTO SUPERIOR VENA CAVA, PERCUTANEOUS APPROACH: ICD-10-PCS | Performed by: INTERNAL MEDICINE

## 2018-04-14 PROCEDURE — 85025 COMPLETE CBC W/AUTO DIFF WBC: CPT

## 2018-04-14 PROCEDURE — 99232 SBSQ HOSP IP/OBS MODERATE 35: CPT | Mod: GC | Performed by: INTERNAL MEDICINE

## 2018-04-14 PROCEDURE — 36415 COLL VENOUS BLD VENIPUNCTURE: CPT

## 2018-04-14 PROCEDURE — A9270 NON-COVERED ITEM OR SERVICE: HCPCS | Performed by: STUDENT IN AN ORGANIZED HEALTH CARE EDUCATION/TRAINING PROGRAM

## 2018-04-14 PROCEDURE — 36569 INSJ PICC 5 YR+ W/O IMAGING: CPT

## 2018-04-14 RX ORDER — HYDROCORTISONE ACETATE 25 MG/1
25 SUPPOSITORY RECTAL EVERY 12 HOURS PRN
Status: DISCONTINUED | OUTPATIENT
Start: 2018-04-14 | End: 2018-04-18 | Stop reason: HOSPADM

## 2018-04-14 RX ORDER — MAGNESIUM SULFATE HEPTAHYDRATE 40 MG/ML
2 INJECTION, SOLUTION INTRAVENOUS ONCE
Status: COMPLETED | OUTPATIENT
Start: 2018-04-14 | End: 2018-04-14

## 2018-04-14 RX ADMIN — IRON SUCROSE 200 MG: 20 INJECTION, SOLUTION INTRAVENOUS at 09:00

## 2018-04-14 RX ADMIN — AMITRIPTYLINE HYDROCHLORIDE 25 MG: 25 TABLET, FILM COATED ORAL at 20:01

## 2018-04-14 RX ADMIN — OXYCODONE HYDROCHLORIDE 5 MG: 5 TABLET ORAL at 10:17

## 2018-04-14 RX ADMIN — POTASSIUM PHOSPHATE, MONOBASIC AND POTASSIUM PHOSPHATE, DIBASIC 10 MMOL: 224; 236 INJECTION, SOLUTION INTRAVENOUS at 15:49

## 2018-04-14 RX ADMIN — OXYCODONE HYDROCHLORIDE 5 MG: 5 TABLET ORAL at 13:31

## 2018-04-14 RX ADMIN — DIBASIC SODIUM PHOSPHATE, MONOBASIC POTASSIUM PHOSPHATE AND MONOBASIC SODIUM PHOSPHATE 1 TABLET: 852; 155; 130 TABLET ORAL at 23:23

## 2018-04-14 RX ADMIN — OXYCODONE HYDROCHLORIDE 5 MG: 5 TABLET ORAL at 19:55

## 2018-04-14 RX ADMIN — OXYCODONE HYDROCHLORIDE 5 MG: 5 TABLET ORAL at 16:57

## 2018-04-14 RX ADMIN — AMPICILLIN SODIUM AND SULBACTAM SODIUM 1.5 G: 1; .5 INJECTION, POWDER, FOR SOLUTION INTRAMUSCULAR; INTRAVENOUS at 23:22

## 2018-04-14 RX ADMIN — ACETAMINOPHEN 650 MG: 325 TABLET, FILM COATED ORAL at 05:04

## 2018-04-14 RX ADMIN — AMPICILLIN SODIUM AND SULBACTAM SODIUM 1.5 G: 1; .5 INJECTION, POWDER, FOR SOLUTION INTRAMUSCULAR; INTRAVENOUS at 15:00

## 2018-04-14 RX ADMIN — VITAMIN D, TAB 1000IU (100/BT) 1000 UNITS: 25 TAB at 09:00

## 2018-04-14 RX ADMIN — STANDARDIZED SENNA CONCENTRATE AND DOCUSATE SODIUM 2 TABLET: 8.6; 5 TABLET, FILM COATED ORAL at 19:56

## 2018-04-14 RX ADMIN — OXYCODONE HYDROCHLORIDE 5 MG: 5 TABLET ORAL at 05:05

## 2018-04-14 RX ADMIN — HYDROCORTISONE ACETATE 25 MG: 25 SUPPOSITORY RECTAL at 15:49

## 2018-04-14 RX ADMIN — MAGNESIUM SULFATE HEPTAHYDRATE 2 G: 40 INJECTION, SOLUTION INTRAVENOUS at 15:50

## 2018-04-14 RX ADMIN — DIBASIC SODIUM PHOSPHATE, MONOBASIC POTASSIUM PHOSPHATE AND MONOBASIC SODIUM PHOSPHATE 1 TABLET: 852; 155; 130 TABLET ORAL at 05:04

## 2018-04-14 RX ADMIN — DIBASIC SODIUM PHOSPHATE, MONOBASIC POTASSIUM PHOSPHATE AND MONOBASIC SODIUM PHOSPHATE 1 TABLET: 852; 155; 130 TABLET ORAL at 16:57

## 2018-04-14 ASSESSMENT — ENCOUNTER SYMPTOMS
SEIZURES: 0
NECK PAIN: 0
COUGH: 0
BACK PAIN: 0
DOUBLE VISION: 0
SHORTNESS OF BREATH: 0
DIZZINESS: 0
PALPITATIONS: 0
TREMORS: 0
HEMOPTYSIS: 0
NAUSEA: 0
SORE THROAT: 0
FEVER: 0
BRUISES/BLEEDS EASILY: 0
CHILLS: 0
BLURRED VISION: 0
VOMITING: 0

## 2018-04-14 ASSESSMENT — PAIN SCALES - GENERAL
PAINLEVEL_OUTOF10: 3
PAINLEVEL_OUTOF10: 7
PAINLEVEL_OUTOF10: 10
PAINLEVEL_OUTOF10: 4
PAINLEVEL_OUTOF10: 6
PAINLEVEL_OUTOF10: 4
PAINLEVEL_OUTOF10: 5

## 2018-04-14 NOTE — PROGRESS NOTES
Pt is AAOx4  Pt reports a 6/10 pain level  Medicated per MAR  Dressing from hemovac is saturated with drainage, will perform a dressing change after pt gets some rest  Pt has redness and scab on sacrum from bed pan  SCD in place  POC discussed  All needs met at this time  Bed in low position  Call light within reach  Rounding in place

## 2018-04-14 NOTE — CARE PLAN
Problem: Venous Thromboembolism (VTW)/Deep Vein Thrombosis (DVT) Prevention:  Goal: Patient will participate in Venous Thrombosis (VTE)/Deep Vein Thrombosis (DVT)Prevention Measures    Intervention: Ensure patient wears graduated elastic stockings (LAZARO hose) and/or SCDs, if ordered, when in bed or chair (Remove at least once per shift for skin check)  SCD in place. Pt tolerating well      Problem: Fluid Volume:  Goal: Will maintain balanced intake and output    Intervention: Monitor, educate, and encourage compliance with therapeutic intake of liquids  Pt has no IV access. Pt educated on the importance of oral fluid intake.  Water provided at bedside.  Will continue to encourage fluids

## 2018-04-14 NOTE — PROGRESS NOTES
"Patient seen and examined  WBC down slightly but still elevated    Blood pressure 135/80, pulse 97, temperature 36.6 °C (97.8 °F), resp. rate 16, height 1.676 m (5' 6\"), weight 59 kg (130 lb), SpO2 95 %, not currently breastfeeding.    Recent Labs      04/11/18   0814  04/13/18   0346   WBC  22.9*  18.9*   RBC  3.40*  3.55*   HEMOGLOBIN  10.0*  10.4*   HEMATOCRIT  30.7*  32.5*   MCV  90.3  91.5   MCH  29.4  29.3   MCHC  32.6*  32.0*   RDW  44.7  45.3   PLATELETCT  468*  472*   MPV  8.8*  9.2       No acute distress  Dressing clean dry and intact  Neurovascularly intact    POD#5    Plan:  DVT Prophylaxis- TEDS/SCDs  Weight Bearing Status-WBAT  PT/OT  Antibiotics: per ID  Case Coordination          "

## 2018-04-14 NOTE — PROGRESS NOTES
87 year old female seen in f/u for destructive left hip abscess and osteomyelitis    HPI: This is a very nice 87-year-old patient with rheumatoid   arthritis, apparently fell and hit her left hip and has now developed a   rapidly progressive destructive osteomyelitis and abscess of her left hip.    Dr. Patel    drained  about 200 mL of pus and left a drain   in.  The patient is feeling better, has no fever but continues to complain of rectal discomfort. She has had this for years, relieved at home with suppositories.  Appetite is poor due to the pain.     Two days ago antibiotics switched.   Meds- iv  Amp/sulbactam 1.5 grams every 6 hours    ROS- denies fever or chills,              No respiratory complaints             No rash     PHYSICAL EXAMINATION:  VITAL SIGNS:  She remains afebrile.  Her blood pressure is 115/70.  GENERAL:  She is alert, cooperative, and fully oriented   NECK:  Supple.  .  LUNGS:  Anteriorly are clear.  HEART:  I do not appreciate any heart murmur.  ABDOMEN:  Soft and no masses.  SKIN:  Shows no rash.  EXTREMITIES:  She has a drain in the left hip and she has resolving edema of   the left leg and minimal erythema.  She has pulses in both feet.  NEUROLOGIC:  She is oriented x3.  PSYCHIATRIC:  Her mood is completely normal.   Results for ALEC WALKER (MRN [id number]) as of 4/11/2018 15:43      Assess: hip abscess- all aerobic and anaerobic cultures are negative, However, detailed look at gram stain shows a few polys with multiple intracellular gram negative diplococci c/w moraxella. ? Seeded from a respiratory source, no evidence for staph/MRSA/enterics/ or anaerobes/  REC: continue iv amp/sulbactam then can switch to daily ertapenem if goes to SNF            Weekly ESR can be helpful to document resolving infection, was 104 mm/hr on admit

## 2018-04-14 NOTE — PROGRESS NOTES
Internal Medicine Interval Note  Note Author: Zo Stuart M.D.     Name Lizette Means     3/8/1931   Age/Sex 87 y.o. female   MRN [id number]   Code Status DNR     After 5PM or if no immediate response to page, please call for cross-coverage  Attending/Team: Dr. Morales /Mary See Patient List for primary contact information  Call [phone] to page    1st Call - Day Intern (R1):   Dr. Stuart 2nd Call - Day Sr. Resident (R2/R3):   Dr. Escobar         Reason for interval visit  (Principal Problem)   Sepsis (CMS-[facility]CC)    Interval Problem Daily Status Update  (24 hours)   Afebrile, asymptomatic tachycardia to 130s overnight, resolved this am. Previous monitoring showed sinus tachycardia.  PICC placement attempted unsuccessfully yesterday. Will re-attempt today and if unsuccessful, will refer to IR on Mon. IV access lost yesterday and was not re-established. Will re-establish IV access of some kind today.  Cultures NGTD. Continue Unasyn per ID.  Good urine output, last BM .      Review of Systems   Constitutional: Negative for chills and fever.   HENT: Negative for congestion and sore throat.    Eyes: Negative for blurred vision and double vision.   Respiratory: Negative for cough, hemoptysis and shortness of breath.    Cardiovascular: Negative for chest pain and palpitations.   Gastrointestinal: Negative for nausea and vomiting.   Genitourinary: Negative for dysuria and urgency.   Musculoskeletal: Negative for back pain and neck pain.   Skin: Negative for itching and rash.   Neurological: Negative for dizziness, tremors and seizures.   Endo/Heme/Allergies: Negative for environmental allergies. Does not bruise/bleed easily.       Consultants/Specialty  Orthopedic surgery  Infectious disease    Disposition  Inpatient for treatment of septic arthritis    Quality Measures  Quality-Core Measures   Reviewed items::  EKG reviewed, Medications reviewed, Labs reviewed and Radiology images reviewed  Jorgito  catheter::  One or Two Days Post Surgery (Day of Surgery being Day 0)  DVT prophylaxis pharmacological::  Enoxaparin (Lovenox)  DVT prophylaxis - mechanical:  SCDs  Ulcer Prophylaxis::  Not indicated  Antibiotics:  Treating active infection/contamination beyond 24 hours perioperative coverage  Assessed for rehabilitation services:  Patient was assess for and/or received rehabilitation services during this hospitalization      Physical Exam     Vitals:    04/14/18 0400 04/14/18 0500 04/14/18 0736 04/14/18 1135   BP:   119/74 117/67   Pulse: (!) 107 97 (!) 116 97   Resp:   18 16   Temp:   36 °C (96.8 °F) 36.9 °C (98.4 °F)   SpO2:   94% 95%   Weight:       Height:         Body mass index is 20.98 kg/m².    Oxygen Therapy:  Pulse Oximetry: 95 %, O2 (LPM): 0, O2 Delivery: None (Room Air)    Physical Exam   Constitutional: She is oriented to person, place, and time and well-developed, well-nourished, and in no distress.   HENT:   Head: Normocephalic and atraumatic.   Mouth/Throat: No oropharyngeal exudate.   Eyes: Conjunctivae are normal. Pupils are equal, round, and reactive to light. No scleral icterus.   Neck: Normal range of motion. Neck supple. No thyromegaly present.   Cardiovascular: Normal rate, regular rhythm, normal heart sounds and intact distal pulses.  Exam reveals no gallop and no friction rub.    No murmur heard.  Pulmonary/Chest: Effort normal and breath sounds normal. No respiratory distress. She has no wheezes. She has no rales.   Abdominal: Soft. Bowel sounds are normal. She exhibits no distension. There is no tenderness. There is no guarding.   Musculoskeletal: Normal range of motion. She exhibits no edema.   LLE neurovascularly intact with 5/5 strength, DP/PT pulses present. Sterile dressing to hip c/d/i, surrounding skin nonerythematous, nonedematous. Hemovac w serosanguinous drainage.   Lymphadenopathy:     She has no cervical adenopathy.   Neurological: She is alert and oriented to person, place,  and time. No cranial nerve deficit.   Skin: Skin is warm and dry.   Psychiatric: Mood and affect normal.       Lab Data Review:     Recent Labs      04/13/18   0346  04/14/18   0856   SODIUM  135  133*   POTASSIUM  4.4  3.6   CHLORIDE  103  102   CO2  23  24   BUN  15  11   CREATININE  0.61  0.65   MAGNESIUM  1.8  1.7   PHOSPHORUS   --   2.7   CALCIUM  7.8*  7.9*       Recent Labs      04/13/18   0346  04/14/18   0856   ALTSGPT   --   17   ASTSGOT   --   24   ALKPHOSPHAT   --   143*   TBILIRUBIN   --   0.5   GLUCOSE  102*  175*       Recent Labs      04/13/18   0346  04/14/18   0856   RBC  3.55*  3.53*   HEMOGLOBIN  10.4*  10.6*   HEMATOCRIT  32.5*  31.3*   PLATELETCT  472*  528*       Recent Labs      04/13/18   0346  04/14/18   0856   WBC  18.9*  21.3*   NEUTSPOLYS  78.70*  80.20*   LYMPHOCYTES  12.00*  12.00*   MONOCYTES  6.70  5.40   EOSINOPHILS  0.60  0.30   BASOPHILS  0.50  0.40   ASTSGOT   --   24   ALTSGPT   --   17   ALKPHOSPHAT   --   143*   TBILIRUBIN   --   0.5           Assessment/Plan     * Sepsis (CMS-HCC)- (present on admission)   Assessment & Plan    This is sepsis (without associated acute organ dysfunction). SIRS 2/4 w tachycardia, leukocytosis on admission. Persistent. Source: left hip arthritis/osteomyelitis vs perirectal abscess. S/p sepsis protocol w abx, IVF. Vanco+Ceftriaxone changed to Unasyn on 04/12. IV access lost on 4/13 and must be re-established ASAP.  - replace PIV, if unsuccessful another form of IV access must be established  - re-attempt PICC placement, do not delay re-establishing IV access in hope of PICC placement  - continue Unasyn  - f/u cultures  - ID consulted, appreciate recs        Osteomyelitis (CMS-HCC)- (present on admission)   Assessment & Plan    Identified on CT scan, no MRI available. Transferred from [facility] for Left femoral head fracture & dislocation associated with surrounding abscess that has spread to rectum which were found on  Imaging study. ED  physician consulted Dr. Patel (Ortho) & pt was taken to OR urgently. Pt did not have any implants in the effected area. Vanco+Ceftriaxone changed to Unasyn on 04/12.   - continue postoperative pain control with scheduled acetaminophen, prn oxycodone  - continue vitamin D  - PICC today for IV abx        Closed left hip fracture, sequela- (present on admission)   Assessment & Plan    GLF 3 days prior to admission, landed on left side. S/p I&D left hip on 4/9/18. No implants in left hip. Gram stain w some WBCs, rare gram pos cocci. Cultures NGTD. Vancomycin, ceftriaxone (04/09-04/12) changed to Unasyn (04/12-->)  - continue Unasyn  - continue pain control with scheduled acetaminophen, prn oxycodone  - d/c IV dilaudid as it is not being utilized  - supplement vitamin D   - continue Lovenox for DVT ppx        Iron deficiency anemia due to chronic blood loss- (present on admission)   Assessment & Plan    H/H 10/30 on admit and stable. History of Hemorrhoids. Bright red blood in the toilet with every bowel movement, no black stool. Pt had colonoscopy in the past which showed benign polyps. FOBT moderate, suspect chronic blood loss component from GI bleed vs perirectal abscess. Not acute blood loss due to surgery, as H/H stable since preop. Iron deficient, % saturation low, IV iron replacement started on 4/10.   - daily CBC   - Will transfuse if Hb < 7  - last dose IV iron today  - consider PPI, GI consult if decreasing H/H or persistent FOBT positive after wound controlled        Debility- (present on admission)   Assessment & Plan    Age 87, admitted for Left femoral head fracture & dislocation associated with surrounding abscess that has spread to rectum. GLF 3 weeks ago resulting in patient becoming bed bound. Pt used wheelchair to ambulate at baseline.  - continue vitamin D supplementation  - continue pressure ulcer prevention protocol   - PT/OT pending.         Fall from ground level- (present on admission)    Assessment & Plan    Multiple ground level falls for the last month & 3 weeks ago, she had a ground level fall & landed on her left side  - continue Vitamin D started   - PT/OT as tolerated        Hypertension- (present on admission)   Assessment & Plan    On lisinopril & chlorthalidone at home. Normotensive this admission.  - hold antihypertensives for BP support in the setting of sepsis         Dementia- (present on admission)   Assessment & Plan    - chronic   - will CTM

## 2018-04-14 NOTE — PROGRESS NOTES
PICC Note:  Attempted PICC placement to RUE.  Unable to advance guidewire in both basilic and cephalic veins.  Patient wanting to not continue to PICC placement.  Will attempt to place PICC in LUE on 4/14 if possible.

## 2018-04-14 NOTE — NON-PROVIDER
Internal Medicine Interval Note  Note Author: Devon Hernandez, Student     Name Lizette Means     3/8/1931   Age/Sex 87 y.o. female   MRN [id number]   Code Status DNR     After 5PM or if no immediate response to page, please call for cross-coverage  Attending/Team: Dr. Morales See Patient List for primary contact information  Call [phone] to page    1st Call - Day Intern (R1):   Dr. Stuart 2nd Call - Day Sr. Resident (R2/R3):   Dr. Escobar         Reason for interval visit  (Principal Problem)   Sepsis (CMS-[facility])    87 year old female who presented to [facility] as a transfer from [place] with osteomyelitis. She is s/p I&D of hip. Originally on vancomycin and ceftriaxone. Switched to amp/sulbactam IV 1.5 grams every 6 hours     Interval Problem Daily Status Update  (24 hours)   - UA showed trace occult blood but was otherwise negative  - Rectal pain has returned  - PICC line not placed yesterday, planning on attempting to replace     ROS  Constitutional: Negative for chills, fever and weight loss.   HENT: Negative for hearing loss.    Eyes: Negative for blurred vision and photophobia.   Respiratory: Negative for cough and shortness of breath.    Cardiovascular: Negative for chest pain, palpitations and leg swelling.   Gastrointestinal:. Negative for abdominal pain, constipation, heartburn, nausea and vomiting.   Genitourinary: Negative for dysuria and urgency. Rectal pain  Neurological: Negative for dizziness, loss of consciousness and headaches.     Consultants/Specialty  Ortho  I&D     Disposition  Inpatient for management of placement of PICC line    Quality-Core Measures  Reviewed items::  EKG reviewed, Medications reviewed, Labs reviewed and Radiology images reviewed  Bull catheter:: No Bull    DVT prophylaxis pharmacological::  Enoxaparin (Lovenox) and SCDs  Antibiotics:  Treating active infection/contamination beyond 24 hours perioperative coverage      Physical Exam       Vitals:    18 2300  04/14/18 0320 04/14/18 0400 04/14/18 0500   BP: 126/71 135/80     Pulse: (!) 124 (!) 134 (!) 107 97   Resp: 16 16     Temp: 36.6 °C (97.8 °F) 36.6 °C (97.8 °F)     SpO2: 95% 95%     Weight:       Height:         Body mass index is 20.98 kg/m².    Oxygen Therapy:  Pulse Oximetry: 95 %, O2 (LPM): 0, O2 Delivery: None (Room Air)    Physical Exam  Constitutional: She is oriented to person, place, and time and well-developed, well-nourished.   HENT:   Head: Normocephalic and atraumatic.   Eyes: Pupils are equal, round, and reactive to light.   Neck: Normal range of motion. Neck supple.   Cardiovascular: Normal rate and regular rhythm.    Pulmonary/Chest: Effort normal and breath sounds normal.   Abdominal: She exhibits no distension and no mass. No tenderness or guarding   Musculoskeletal:   MSK exam limited by fractured left hip   Neurological: She is alert and oriented to person, place, and time.   Skin: Skin is warm and dry. No erythema.   Dressing clean, dry and intact     Lab Data Review:         4/14/2018  6:01 AM    Recent Labs      04/13/18 0346 04/14/18   0856   SODIUM  135  133*   POTASSIUM  4.4  3.6   CHLORIDE  103  102   CO2  23  24   BUN  15  11   CREATININE  0.61  0.65   MAGNESIUM  1.8  1.7   PHOSPHORUS   --   2.7   CALCIUM  7.8*  7.9*       Recent Labs      04/13/18 0346 04/14/18   0856   ALTSGPT   --   17   ASTSGOT   --   24   ALKPHOSPHAT   --   143*   TBILIRUBIN   --   0.5   GLUCOSE  102*  175*       Recent Labs      04/13/18 0346 04/14/18   0856   RBC  3.55*  3.53*   HEMOGLOBIN  10.4*  10.6*   HEMATOCRIT  32.5*  31.3*   PLATELETCT  472*  528*       Recent Labs      04/13/18 0346 04/14/18   0856   WBC  18.9*  21.3*   NEUTSPOLYS  78.70*  80.20*   LYMPHOCYTES  12.00*  12.00*   MONOCYTES  6.70  5.40   EOSINOPHILS  0.60  0.30   BASOPHILS  0.50  0.40   ASTSGOT   --   24   ALTSGPT   --   17   ALKPHOSPHAT   --   143*   TBILIRUBIN   --   0.5           Assessment/Plan     #Osteomyelitis  - Transferred  from [place], with septic arthritis underwent I&D on 4/9               - WBC of 23.4 on admission              - Patient has been afebrile and has not shown any signs of infection since I&D  - Blood cultures are negative   - Urine culture negative   - ID review of gram stain showed multiple intracellular gram negative diplococci consistent with moraxella              - possible lung source  - PT states that patient would benefit from post acute therapy   - OT states that patient would benefit from further therapy at SNF      Plan:  - ID recommend switching to amp/sulbactam iv 1.5 grams every 6 hours after review of gram stain              - Can switch to a 6 week course of ertapenem once PICC line placed and discharged to SNF  - ID recommends PICC line due to negative blood cultures   - Plan to get into seated position as tolerated      #Iron deficiency anemia  - Hb of 10 on admission   - Previous colonscopy showed benign poylps      Plan:  - Plan to transfuse if Hb<7   - Iron sucrose 200mg IV daily for 5 doses     #Tachycardia with frequent PACs  - Patient has intermittent tachycardia with PACs  - Previously noted on EKGs on 4/10  - 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      #Rectal pain  - Patient reported multiple days of 10/10 pain               - Pain localized to rectum               - Severe pain with bowel movements               - No pain over leg incision     Plan:  - Plan on hydrocortisone 25mg suppository cream for rectal pain  - Patient instructed to ask for pain medication if needed  - Continue to monitor and control pain   - Pain control per orthopaedics with:              - scheduled acetaminophen PO 650mg Q6              - oxycodone 5 or 10mg PO Q3 PRN               - hydromorphone 0.5mg IV Q3 PRN     #Rheumatoid arthritis  - Chronic condition for approximately 8 years  - Required her to use a wheelchair for mobility  approximately 6 months ago  - Does not ambulate   - Chronically manages with only pain medication    Plan:  - Follow up with outpatient physician for chronic management

## 2018-04-14 NOTE — PROGRESS NOTES
PICC Insertion Procedure Note    Consents confirmed, vessel patency confirmed with ultrasound, real time ultrasound image printed and placed in patient chart. Risks and benefits of procedure explained to patient/family and education regarding central line associated bloodstream infections provided. Questions answered.     PICC placed in left upper arm per MD order with ultrasound guidance. 4 Fr, single lumen PICC placed in brachial vein after one attempt(s). 2 cc's of 1% lidocaine injected intradermally, 21 gauge microintroducer needle and modified Seldinger technique used. 38 cm total catheter length, 38 cm external measurement inserted with good blood return. Each lumen flushed without resistance with 10 mL 0.9% normal saline. PICC line secured with Biopatch and Tegaderm.    PICC tip location in the SVC confirmed by chest xray. Pt tolerated procedure well.  Patient condition relayed to unit RN or ordering physician via this post procedure note in the EMR.     [employer] Power PICC ref # [id number], Lot #  [id number]

## 2018-04-14 NOTE — PROGRESS NOTES
"Hourly rounding, call bell within reach. Patient educated about plan of care, pain management, call bell use, and mobility. Patient demonstrates good bed mobility but still refuses to get up to chair, only sits edge of bed for meals. PICC line placed today and IV antibiotics resumed. Family and daughter visited today, update given in room. Patient complains of rectal pain, suppository and prn pain medications given. Refuses tylenol saying, \"it does nothing for me.\" Patient alert and oriented x4 this shift, will monitor.   "

## 2018-04-14 NOTE — PROGRESS NOTES
Internal Medicine Interval Note  Note Author: Zo Stuart M.D.     Name Lizette Means     3/8/1931   Age/Sex 87 y.o. female   MRN [id number]   Code Status DNR     After 5PM or if no immediate response to page, please call for cross-coverage  Attending/Team: Dr. Morales /Mary See Patient List for primary contact information  Call [phone] to page    1st Call - Day Intern (R1):   Dr. Stuart 2nd Call - Day Sr. Resident (R2/R3):   Dr. Escobar         Reason for interval visit  (Principal Problem)   Sepsis (CMS-[facility])    Interval Problem Daily Status Update  (24 hours)   Afebrile, continues to be mildly tachycardic.  Abx switched to Unasyn yesterday per ID. Cultures NGTD.  Pain well controlled. IV hydromorphone not being utilized. Neurovascularly intact. No evidence of bleeding. Hemovac w 100 ml in last 24h.  Good urine output. BM x2 yesterday.       Review of Systems   Constitutional: Negative for chills and fever.   HENT: Negative for congestion and sore throat.    Eyes: Negative for blurred vision and double vision.   Respiratory: Negative for cough, hemoptysis and shortness of breath.    Cardiovascular: Negative for chest pain and palpitations.   Gastrointestinal: Negative for nausea and vomiting.   Genitourinary: Negative for dysuria and urgency.   Musculoskeletal: Negative for back pain and neck pain.   Skin: Negative for itching and rash.   Neurological: Negative for dizziness, tremors and seizures.   Endo/Heme/Allergies: Negative for environmental allergies. Does not bruise/bleed easily.       Consultants/Specialty  Orthopedic surgery  Infectious disease    Disposition  Inpatient for treatment of septic arthritis    Quality Measures  Quality-Core Measures   Reviewed items::  EKG reviewed, Medications reviewed, Labs reviewed and Radiology images reviewed  Bull catheter::  One or Two Days Post Surgery (Day of Surgery being Day 0)  DVT prophylaxis pharmacological::  Enoxaparin (Lovenox)  DVT  prophylaxis - mechanical:  SCDs  Ulcer Prophylaxis::  Not indicated  Antibiotics:  Treating active infection/contamination beyond 24 hours perioperative coverage  Assessed for rehabilitation services:  Patient was assess for and/or received rehabilitation services during this hospitalization      Physical Exam     Vitals:    04/12/18 1900 04/13/18 0440 04/13/18 0800 04/13/18 1600   BP: 133/61 128/73 148/68 132/70   Pulse: 91 (!) 110 98 85   Resp: 14 16 15 14   Temp:  36.9 °C (98.5 °F) 36.9 °C (98.5 °F) 36.9 °C (98.5 °F)   SpO2: 94% 92% 99% 98%   Weight:       Height:         Body mass index is 20.98 kg/m².    Oxygen Therapy:  Pulse Oximetry: 98 %, O2 (LPM): 0, O2 Delivery: None (Room Air)    Physical Exam   Constitutional: She is oriented to person, place, and time and well-developed, well-nourished, and in no distress.   HENT:   Head: Normocephalic and atraumatic.   Mouth/Throat: No oropharyngeal exudate.   Eyes: Conjunctivae are normal. Pupils are equal, round, and reactive to light. No scleral icterus.   Neck: Normal range of motion. Neck supple. No thyromegaly present.   Cardiovascular: Normal rate, regular rhythm, normal heart sounds and intact distal pulses.  Exam reveals no gallop and no friction rub.    No murmur heard.  Pulmonary/Chest: Effort normal and breath sounds normal. No respiratory distress. She has no wheezes. She has no rales.   Abdominal: Soft. Bowel sounds are normal. She exhibits no distension. There is no tenderness. There is no guarding.   Musculoskeletal: Normal range of motion. She exhibits no edema.   LLE neurovascularly intact with 5/5 strength, DP/PT pulses present. Sterile dressing to hip c/d/i, surrounding skin nonerythematous, nonedematous. Hemovac w serosanguinous drainage.   Lymphadenopathy:     She has no cervical adenopathy.   Neurological: She is alert and oriented to person, place, and time. No cranial nerve deficit.   Skin: Skin is warm and dry.   Psychiatric: Mood and affect  normal.       Lab Data Review:     Recent Labs      04/11/18   0814  04/13/18   0346   SODIUM  137  135   POTASSIUM  3.5*  4.4   CHLORIDE  102  103   CO2  27  23   BUN  26*  15   CREATININE  0.85  0.61   MAGNESIUM  2.1  1.8   PHOSPHORUS  2.3*   --    CALCIUM  8.1*  7.8*       Recent Labs      04/11/18   0814  04/13/18   0346   ALTSGPT  13   --    ASTSGOT  23   --    ALKPHOSPHAT  102*   --    TBILIRUBIN  0.4   --    GLUCOSE  100*  102*       Recent Labs      04/11/18   0814  04/13/18   0346   RBC  3.40*  3.55*   HEMOGLOBIN  10.0*  10.4*   HEMATOCRIT  30.7*  32.5*   PLATELETCT  468*  472*       Recent Labs      04/11/18 0814 04/13/18   0346   WBC  22.9*  18.9*   NEUTSPOLYS  83.40*  78.70*   LYMPHOCYTES  9.90*  12.00*   MONOCYTES  4.40  6.70   EOSINOPHILS  0.60  0.60   BASOPHILS  0.30  0.50   ASTSGOT  23   --    ALTSGPT  13   --    ALKPHOSPHAT  102*   --    TBILIRUBIN  0.4   --            Assessment/Plan     * Sepsis (CMS-HCC)- (present on admission)   Assessment & Plan    This is sepsis (without associated acute organ dysfunction). SIRS 2/4 w tachycardia, leukocytosis on admission. Persistent. Source: left hip arthritis/osteomyelitis vs perirectal abscess. S/p sepsis protocol w abx, IVF. Vanco+Ceftriaxone changed to Unasyn on 04/12.   - continue Unasyn  - f/u cultures  - ID consulted, appreciate recs        Osteomyelitis (CMS-HCC)- (present on admission)   Assessment & Plan    Identified on CT scan, no MRI available. Transferred from [facility] for Left femoral head fracture & dislocation associated with surrounding abscess that has spread to rectum which were found on  Imaging study. ED physician consulted Dr. Patel (Ortho) & pt was taken to OR urgently. Pt did not have any implants in the effected area. Vanco+Ceftriaxone changed to Unasyn on 04/12.   - continue postoperative pain control with scheduled acetaminophen, prn oxycodone  - continue vitamin D  - PICC today for IV abx        Closed left hip  fracture, sequela- (present on admission)   Assessment & Plan    GLF 3 days prior to admission, landed on left side. S/p I&D left hip on 4/9/18. No implants in left hip. Gram stain w some WBCs, rare gram pos cocci. Cultures NGTD. Vancomycin, ceftriaxone (04/09-04/12) changed to Unasyn (04/12-->)  - continue Unasyn  - continue pain control with scheduled acetaminophen, prn oxycodone  - d/c IV dilaudid as it is not being utilized  - supplement vitamin D   - continue Lovenox for DVT ppx        Iron deficiency anemia due to chronic blood loss- (present on admission)   Assessment & Plan    H/H 10/30 on admit and stable. History of Hemorrhoids. Bright red blood in the toilet with every bowel movement, no black stool. Pt had colonoscopy in the past which showed benign polyps. FOBT moderate, suspect chronic blood loss component from GI bleed vs perirectal abscess. Not acute blood loss due to surgery, as H/H stable since preop. Iron deficient, % saturation low, IV iron replacement started on 4/10.   - daily CBC   - Will transfuse if Hb < 7  - continue IV iron  - consider PPI, GI consult if decreasing H/H or persistent FOBT positive after wound controlled        Debility- (present on admission)   Assessment & Plan    Age 87, admitted for Left femoral head fracture & dislocation associated with surrounding abscess that has spread to rectum. GLF 3 weeks ago resulting in patient becoming bed bound. Pt used wheelchair to ambulate at baseline.  - continue vitamin D supplementation  - continue pressure ulcer prevention protocol   - PT/OT pending.         Fall from ground level- (present on admission)   Assessment & Plan    Multiple ground level falls for the last month & 3 weeks ago, she had a ground level fall & landed on her left side  - continue Vitamin D started   - PT/OT as tolerated        Hypertension- (present on admission)   Assessment & Plan    On lisinopril & chlorthalidone at home. Normotensive this admission.  - hold  antihypertensives for BP support in the setting of sepsis         Dementia- (present on admission)   Assessment & Plan    - chronic   - will CTM

## 2018-04-14 NOTE — PROGRESS NOTES
Spoke with [facility] white team regarding pt MEWS score of 3.  Spoke with Dr. Moses who ordered a bladder scan, urinalysis and urine culture

## 2018-04-15 LAB
ALBUMIN SERPL BCP-MCNC: 2.3 G/DL (ref 3.2–4.9)
ALBUMIN/GLOB SERPL: 0.7 G/DL
ALP SERPL-CCNC: 141 U/L (ref 30–99)
ALT SERPL-CCNC: 17 U/L (ref 2–50)
ANION GAP SERPL CALC-SCNC: 8 MMOL/L (ref 0–11.9)
AST SERPL-CCNC: 23 U/L (ref 12–45)
BACTERIA SPEC ANAEROBE CULT: NORMAL
BASOPHILS # BLD AUTO: 0.3 % (ref 0–1.8)
BASOPHILS # BLD: 0.06 K/UL (ref 0–0.12)
BILIRUB SERPL-MCNC: 0.5 MG/DL (ref 0.1–1.5)
BUN SERPL-MCNC: 9 MG/DL (ref 8–22)
CALCIUM SERPL-MCNC: 7.9 MG/DL (ref 8.5–10.5)
CHLORIDE SERPL-SCNC: 102 MMOL/L (ref 96–112)
CO2 SERPL-SCNC: 27 MMOL/L (ref 20–33)
CREAT SERPL-MCNC: 0.52 MG/DL (ref 0.5–1.4)
EOSINOPHIL # BLD AUTO: 0.18 K/UL (ref 0–0.51)
EOSINOPHIL NFR BLD: 1 % (ref 0–6.9)
ERYTHROCYTE [DISTWIDTH] IN BLOOD BY AUTOMATED COUNT: 44.6 FL (ref 35.9–50)
GLOBULIN SER CALC-MCNC: 3.2 G/DL (ref 1.9–3.5)
GLUCOSE SERPL-MCNC: 97 MG/DL (ref 65–99)
HCT VFR BLD AUTO: 30 % (ref 37–47)
HGB BLD-MCNC: 9.8 G/DL (ref 12–16)
IMM GRANULOCYTES # BLD AUTO: 0.32 K/UL (ref 0–0.11)
IMM GRANULOCYTES NFR BLD AUTO: 1.8 % (ref 0–0.9)
LYMPHOCYTES # BLD AUTO: 2.53 K/UL (ref 1–4.8)
LYMPHOCYTES NFR BLD: 14 % (ref 22–41)
MAGNESIUM SERPL-MCNC: 2 MG/DL (ref 1.5–2.5)
MCH RBC QN AUTO: 29.6 PG (ref 27–33)
MCHC RBC AUTO-ENTMCNC: 32.7 G/DL (ref 33.6–35)
MCV RBC AUTO: 90.6 FL (ref 81.4–97.8)
MONOCYTES # BLD AUTO: 1.22 K/UL (ref 0–0.85)
MONOCYTES NFR BLD AUTO: 6.8 % (ref 0–13.4)
NEUTROPHILS # BLD AUTO: 13.73 K/UL (ref 2–7.15)
NEUTROPHILS NFR BLD: 76.1 % (ref 44–72)
NRBC # BLD AUTO: 0 K/UL
NRBC BLD-RTO: 0 /100 WBC
PHOSPHATE SERPL-MCNC: 3.2 MG/DL (ref 2.5–4.5)
PLATELET # BLD AUTO: 522 K/UL (ref 164–446)
PMV BLD AUTO: 8.8 FL (ref 9–12.9)
POTASSIUM SERPL-SCNC: 3.9 MMOL/L (ref 3.6–5.5)
PROT SERPL-MCNC: 5.5 G/DL (ref 6–8.2)
RBC # BLD AUTO: 3.31 M/UL (ref 4.2–5.4)
SIGNIFICANT IND 70042: NORMAL
SITE SITE: NORMAL
SODIUM SERPL-SCNC: 137 MMOL/L (ref 135–145)
SOURCE SOURCE: NORMAL
WBC # BLD AUTO: 18 K/UL (ref 4.8–10.8)

## 2018-04-15 PROCEDURE — 700102 HCHG RX REV CODE 250 W/ 637 OVERRIDE(OP): Performed by: INTERNAL MEDICINE

## 2018-04-15 PROCEDURE — 700102 HCHG RX REV CODE 250 W/ 637 OVERRIDE(OP): Performed by: STUDENT IN AN ORGANIZED HEALTH CARE EDUCATION/TRAINING PROGRAM

## 2018-04-15 PROCEDURE — A9270 NON-COVERED ITEM OR SERVICE: HCPCS | Performed by: INTERNAL MEDICINE

## 2018-04-15 PROCEDURE — 302146: Performed by: INTERNAL MEDICINE

## 2018-04-15 PROCEDURE — A9270 NON-COVERED ITEM OR SERVICE: HCPCS | Performed by: ORTHOPAEDIC SURGERY

## 2018-04-15 PROCEDURE — 700105 HCHG RX REV CODE 258: Performed by: INTERNAL MEDICINE

## 2018-04-15 PROCEDURE — 700111 HCHG RX REV CODE 636 W/ 250 OVERRIDE (IP): Performed by: INTERNAL MEDICINE

## 2018-04-15 PROCEDURE — 83735 ASSAY OF MAGNESIUM: CPT

## 2018-04-15 PROCEDURE — 770021 HCHG ROOM/CARE - ISO PRIVATE

## 2018-04-15 PROCEDURE — 700102 HCHG RX REV CODE 250 W/ 637 OVERRIDE(OP): Performed by: ORTHOPAEDIC SURGERY

## 2018-04-15 PROCEDURE — 84100 ASSAY OF PHOSPHORUS: CPT

## 2018-04-15 PROCEDURE — 700112 HCHG RX REV CODE 229: Performed by: ORTHOPAEDIC SURGERY

## 2018-04-15 PROCEDURE — 99232 SBSQ HOSP IP/OBS MODERATE 35: CPT | Mod: GC | Performed by: INTERNAL MEDICINE

## 2018-04-15 PROCEDURE — 85025 COMPLETE CBC W/AUTO DIFF WBC: CPT

## 2018-04-15 PROCEDURE — A9270 NON-COVERED ITEM OR SERVICE: HCPCS | Performed by: STUDENT IN AN ORGANIZED HEALTH CARE EDUCATION/TRAINING PROGRAM

## 2018-04-15 PROCEDURE — 80053 COMPREHEN METABOLIC PANEL: CPT

## 2018-04-15 RX ADMIN — STANDARDIZED SENNA CONCENTRATE AND DOCUSATE SODIUM 2 TABLET: 8.6; 5 TABLET, FILM COATED ORAL at 20:23

## 2018-04-15 RX ADMIN — AMPICILLIN SODIUM AND SULBACTAM SODIUM 1.5 G: 1; .5 INJECTION, POWDER, FOR SOLUTION INTRAMUSCULAR; INTRAVENOUS at 09:25

## 2018-04-15 RX ADMIN — OXYCODONE HYDROCHLORIDE 5 MG: 5 TABLET ORAL at 06:12

## 2018-04-15 RX ADMIN — DIBASIC SODIUM PHOSPHATE, MONOBASIC POTASSIUM PHOSPHATE AND MONOBASIC SODIUM PHOSPHATE 1 TABLET: 852; 155; 130 TABLET ORAL at 10:27

## 2018-04-15 RX ADMIN — OXYCODONE HYDROCHLORIDE 5 MG: 5 TABLET ORAL at 01:14

## 2018-04-15 RX ADMIN — AMPICILLIN SODIUM AND SULBACTAM SODIUM 1.5 G: 1; .5 INJECTION, POWDER, FOR SOLUTION INTRAMUSCULAR; INTRAVENOUS at 13:39

## 2018-04-15 RX ADMIN — STANDARDIZED SENNA CONCENTRATE AND DOCUSATE SODIUM 2 TABLET: 8.6; 5 TABLET, FILM COATED ORAL at 09:24

## 2018-04-15 RX ADMIN — OXYCODONE HYDROCHLORIDE 5 MG: 5 TABLET ORAL at 13:39

## 2018-04-15 RX ADMIN — AMPICILLIN SODIUM AND SULBACTAM SODIUM 1.5 G: 1; .5 INJECTION, POWDER, FOR SOLUTION INTRAMUSCULAR; INTRAVENOUS at 23:25

## 2018-04-15 RX ADMIN — VITAMIN D, TAB 1000IU (100/BT) 1000 UNITS: 25 TAB at 09:24

## 2018-04-15 RX ADMIN — ENOXAPARIN SODIUM 40 MG: 100 INJECTION SUBCUTANEOUS at 09:24

## 2018-04-15 RX ADMIN — AMITRIPTYLINE HYDROCHLORIDE 25 MG: 25 TABLET, FILM COATED ORAL at 20:23

## 2018-04-15 RX ADMIN — HYDROCORTISONE ACETATE 25 MG: 25 SUPPOSITORY RECTAL at 10:27

## 2018-04-15 RX ADMIN — DIBASIC SODIUM PHOSPHATE, MONOBASIC POTASSIUM PHOSPHATE AND MONOBASIC SODIUM PHOSPHATE 1 TABLET: 852; 155; 130 TABLET ORAL at 23:25

## 2018-04-15 RX ADMIN — DIBASIC SODIUM PHOSPHATE, MONOBASIC POTASSIUM PHOSPHATE AND MONOBASIC SODIUM PHOSPHATE 1 TABLET: 852; 155; 130 TABLET ORAL at 16:46

## 2018-04-15 RX ADMIN — AMPICILLIN SODIUM AND SULBACTAM SODIUM 1.5 G: 1; .5 INJECTION, POWDER, FOR SOLUTION INTRAMUSCULAR; INTRAVENOUS at 03:21

## 2018-04-15 RX ADMIN — DOCUSATE SODIUM 100 MG: 100 CAPSULE ORAL at 20:23

## 2018-04-15 RX ADMIN — DIBASIC SODIUM PHOSPHATE, MONOBASIC POTASSIUM PHOSPHATE AND MONOBASIC SODIUM PHOSPHATE 1 TABLET: 852; 155; 130 TABLET ORAL at 04:47

## 2018-04-15 RX ADMIN — OXYCODONE HYDROCHLORIDE 5 MG: 5 TABLET ORAL at 20:23

## 2018-04-15 ASSESSMENT — ENCOUNTER SYMPTOMS
NAUSEA: 0
DOUBLE VISION: 0
COUGH: 0
PALPITATIONS: 0
FEVER: 0
VOMITING: 0
SHORTNESS OF BREATH: 0
BACK PAIN: 0
BLURRED VISION: 0
SEIZURES: 0
BRUISES/BLEEDS EASILY: 0
DIZZINESS: 0
CHILLS: 0
HEMOPTYSIS: 0
NECK PAIN: 0
SORE THROAT: 0
TREMORS: 0

## 2018-04-15 ASSESSMENT — PAIN SCALES - GENERAL
PAINLEVEL_OUTOF10: 6
PAINLEVEL_OUTOF10: 6
PAINLEVEL_OUTOF10: ASSUMED PAIN PRESENT
PAINLEVEL_OUTOF10: 10
PAINLEVEL_OUTOF10: 4
PAINLEVEL_OUTOF10: 10
PAINLEVEL_OUTOF10: 4
PAINLEVEL_OUTOF10: 10
PAINLEVEL_OUTOF10: 6

## 2018-04-15 NOTE — CARE PLAN
Problem: Venous Thromboembolism (VTW)/Deep Vein Thrombosis (DVT) Prevention:  Goal: Patient will participate in Venous Thrombosis (VTE)/Deep Vein Thrombosis (DVT)Prevention Measures    Intervention: Encourage patient to perform ankle flex, foot rotation, and knee flex exercises in addition to other prophylatic measures every hour while awake  Pt refuses to get up and switch positions in bed.  Education provided.  SCD in place.  Encouraged pt to perform foot pumps      Problem: Bowel/Gastric:  Goal: Normal bowel function is maintained or improved  Pt has not had a bowel movement since 4/12/18.  Pt  Given education on the importance of taking her stool softeners.  Pt understood and did not refuse stool softener

## 2018-04-15 NOTE — PROGRESS NOTES
Hourly rounding, call bell within reach. Patient educated about plan of care, pain management, call bell use, and mobility. Patient continues to have good bed mobility and requires one assist on and off of bed pan. Continues to refuse getting up to chair or using bedside commode. Patient calls frequently (multiple times and hour) for bed pan and other help. Patient also continues to yell and shout for help after using call bell. Patient educated about appropriate call bell usage and is alert and oriented x4. Will need reinforcement. Continues to complain of anal pain, MRI ordered. Pain controlled with prn medications and positioning. PICC in place without issue.

## 2018-04-15 NOTE — PROGRESS NOTES
"   Orthopaedic PA Progress Note    Interval changes:Except for leukocytosis is doing well, HV removed, no drainage last 24h. Not hungry.    ROS - Patient denies any new issues. No chest pain, dyspnea, or fever.  Pain well controlled.    Blood pressure 147/71, pulse (!) 128, temperature 37.7 °C (99.8 °F), resp. rate 18, height 1.676 m (5' 6\"), weight 59 kg (130 lb), SpO2 93 %, not currently breastfeeding.    Patient seen and examined  No acute distress  Breathing non labored  RRR  Surgical dressing is clean, dry, and intact. Patient clearly fires tibialis anterior, EHL, and gastrocnemius/soleus. Sensation is intact to light touch throughout superficial peroneal, deep peroneal, tibial, saphenous, and sural nerve distributions. Strong and palpable 2+ dorsalis pedis and posterior tibial pulses with capillary refill less than 2 seconds. No lower leg tenderness or discomfort.    Recent Labs      04/13/18   0346  04/14/18   0856   WBC  18.9*  21.3*   RBC  3.55*  3.53*   HEMOGLOBIN  10.4*  10.6*   HEMATOCRIT  32.5*  31.3*   MCV  91.5  88.7   MCH  29.3  30.0   MCHC  32.0*  33.9   RDW  45.3  43.7   PLATELETCT  472*  528*   MPV  9.2  9.3     Active Hospital Problems    Diagnosis   • Closed left hip fracture, sequela [S72.002S]     Priority: High   • Osteomyelitis (CMS-[facility]) [M86.9]     Priority: High   • Fall from ground level [W18.30XA]     Priority: Medium   • Debility [R53.81]     Priority: Medium   • Iron deficiency anemia due to chronic blood loss [D50.0]     Priority: Medium   • Dementia [F03.90]     Priority: Low   • Hypertension [I10]     Priority: Low   • Sepsis (CMS-[facility]) [A41.9]   • Pain [R52]     Assessment/Plan:  POD#5 S/P I+D L Hip for septic arthritis, persistently elevated WBC  Wt bearing status - patient wheelchair bound , does not walk  PT/OT-initiated  Wound care:silver mepilex dressing for at least 2 more days  Drains - out  Bull-out  Sutures/Staples out- 10-14 days post operatively  Antibiotics: Unasyn  DVT " Prophylaxis- TEDS/SCDs/Foot pumps.   Lovenox: 40 mg/d, Duration-until ambulatory > 150'. H/H stable  Future Procedures - not anticipated  Case Coordination for Discharge Planning - Disposition prognosis guarded, leukocytosis, dementia not eating well

## 2018-04-15 NOTE — DIETARY
Nutrition Services: Update   Consult received for poor PO and calorie count (not indicated). RD already following pt for PO intake. Pt is currently on regular diet and receiving Boost Plus BID. Pt states her appetite has been improving the past couple of days. Pt enjoys the Boost. Pt declined additional snacks. Per chart pt PO %, however most meals >25%. Wt 4/9: 58.968 kg via other health care provider.    Recommendations/Plan:  1. Encourage intake of meals  2. Document intake of all meals as % taken in ADL's to provide interdisciplinary communication across all shifts.   3. Monitor weight.  4. Nutrition rep will continue to see patient for ongoing meal and snack preferences.    RD following

## 2018-04-15 NOTE — PROGRESS NOTES
Internal Medicine Interval Note  Note Author: Zo Stuart M.D.     Name Lizette Means     3/8/1931   Age/Sex 87 y.o. female   MRN [id number]   Code Status DNR     After 5PM or if no immediate response to page, please call for cross-coverage  Attending/Team: Dr. Morales /Mary See Patient List for primary contact information  Call [phone] to page    1st Call - Day Intern (R1):   Dr. Stuart 2nd Call - Day Sr. Resident (R2/R3):   Dr. Escobar         Reason for interval visit  (Principal Problem)   Sepsis (CMS-[facility])    Interval Problem Daily Status Update  (24 hours)   Afebrile, continued asymptomatic tachycardia to 130s. Previous monitoring showed sinus tachycardia.  PICC placed yesterday and functioning well.  Cultures with no growth at 72 hours. Continue Unasyn per ID. Patient complains of continued anal pain and has persistent leukocytosis, despite several days of IV abx.  Good urine output. Last BM , pt has been refusing stool softeners. Counseled on importance of avoiding constipation.      Review of Systems   Constitutional: Negative for chills and fever.   HENT: Negative for congestion and sore throat.    Eyes: Negative for blurred vision and double vision.   Respiratory: Negative for cough, hemoptysis and shortness of breath.    Cardiovascular: Negative for chest pain and palpitations.   Gastrointestinal: Negative for nausea and vomiting.   Genitourinary: Negative for dysuria and urgency.   Musculoskeletal: Negative for back pain and neck pain.   Skin: Negative for itching and rash.   Neurological: Negative for dizziness, tremors and seizures.   Endo/Heme/Allergies: Negative for environmental allergies. Does not bruise/bleed easily.       Consultants/Specialty  Orthopedic surgery  Infectious disease    Disposition  Inpatient for treatment of septic arthritis    Quality Measures  Quality-Core Measures   Reviewed items::  EKG reviewed, Medications reviewed, Labs reviewed and Radiology images  reviewed  Bull catheter::  One or Two Days Post Surgery (Day of Surgery being Day 0)  DVT prophylaxis pharmacological::  Enoxaparin (Lovenox)  DVT prophylaxis - mechanical:  SCDs  Ulcer Prophylaxis::  Not indicated  Antibiotics:  Treating active infection/contamination beyond 24 hours perioperative coverage  Assessed for rehabilitation services:  Patient was assess for and/or received rehabilitation services during this hospitalization      Physical Exam     Vitals:    04/14/18 1135 04/14/18 1537 04/14/18 1945 04/15/18 0355   BP: 117/67 147/71 111/62 134/70   Pulse: 97 (!) 128 (!) 120 (!) 121   Resp: 16 18 18 18   Temp: 36.9 °C (98.4 °F) 37.7 °C (99.8 °F) 36.9 °C (98.5 °F) 36.6 °C (97.9 °F)   SpO2: 95% 93% 97% 94%   Weight:       Height:         Body mass index is 20.98 kg/m².    Oxygen Therapy:  Pulse Oximetry: 94 %, O2 (LPM): 0, O2 Delivery: None (Room Air)    Physical Exam   Constitutional: She is oriented to person, place, and time and well-developed, well-nourished, and in no distress.   HENT:   Head: Normocephalic and atraumatic.   Mouth/Throat: No oropharyngeal exudate.   Eyes: Conjunctivae are normal. Pupils are equal, round, and reactive to light. No scleral icterus.   Neck: Normal range of motion. Neck supple. No thyromegaly present.   Cardiovascular: Normal rate, regular rhythm, normal heart sounds and intact distal pulses.  Exam reveals no gallop and no friction rub.    No murmur heard.  Pulmonary/Chest: Effort normal and breath sounds normal. No respiratory distress. She has no wheezes. She has no rales.   Abdominal: Soft. Bowel sounds are normal. She exhibits no distension. There is no tenderness. There is no guarding.   Musculoskeletal: Normal range of motion. She exhibits no edema.   LLE neurovascularly intact with 5/5 strength, DP/PT pulses present. Sterile dressing to hip c/d/i, surrounding skin nonerythematous, nonedematous. Hemovac w serosanguinous drainage.   Lymphadenopathy:     She has no  cervical adenopathy.   Neurological: She is alert and oriented to person, place, and time. No cranial nerve deficit.   Skin: Skin is warm and dry.   Psychiatric: Mood and affect normal.       Lab Data Review:     Recent Labs      04/13/18 0346 04/14/18   0856  04/15/18   0300   SODIUM  135  133*  137   POTASSIUM  4.4  3.6  3.9   CHLORIDE  103  102  102   CO2  23  24  27   BUN  15  11  9   CREATININE  0.61  0.65  0.52   MAGNESIUM  1.8  1.7  2.0   PHOSPHORUS   --   2.7  3.2   CALCIUM  7.8*  7.9*  7.9*       Recent Labs      04/13/18 0346  04/14/18   0856  04/15/18   0300   ALTSGPT   --   17  17   ASTSGOT   --   24  23   ALKPHOSPHAT   --   143*  141*   TBILIRUBIN   --   0.5  0.5   GLUCOSE  102*  175*  97       Recent Labs      04/13/18 0346 04/14/18   0856  04/15/18   0300   RBC  3.55*  3.53*  3.31*   HEMOGLOBIN  10.4*  10.6*  9.8*   HEMATOCRIT  32.5*  31.3*  30.0*   PLATELETCT  472*  528*  522*       Recent Labs      04/13/18 0346 04/14/18   0856  04/15/18   0300   WBC  18.9*  21.3*  18.0*   NEUTSPOLYS  78.70*  80.20*  76.10*   LYMPHOCYTES  12.00*  12.00*  14.00*   MONOCYTES  6.70  5.40  6.80   EOSINOPHILS  0.60  0.30  1.00   BASOPHILS  0.50  0.40  0.30   ASTSGOT   --   24  23   ALTSGPT   --   17  17   ALKPHOSPHAT   --   143*  141*   TBILIRUBIN   --   0.5  0.5           Assessment/Plan     * Sepsis (CMS-HCC)- (present on admission)   Assessment & Plan    This is sepsis (without associated acute organ dysfunction). SIRS 2/4 w tachycardia, leukocytosis on admission. Persistent. Source: left hip arthritis/osteomyelitis vs perirectal abscess. S/p sepsis protocol w abx, IVF. Cultures with NG. Vanco+Ceftriaxone changed to Unasyn on 04/12. PICC placed 4/14, functioning well. Persistent leukocytosis, tachycardia, and anal pain despite IV abx are concerning for perirectal abscess.  - continue Unasyn  - f/u cultures  - ID consulted, appreciate recs  - MRI pelvis        Osteomyelitis (CMS-HCC)- (present on admission)    Assessment & Plan    Identified on CT scan, no MRI available. Transferred from [facility] for Left femoral head fracture & dislocation associated with surrounding abscess that has spread to rectum which were found on  Imaging study. ED physician consulted Dr. Patel (Ortho) & pt was taken to OR urgently. Pt did not have any implants in the effected area. Vanco+Ceftriaxone changed to Unasyn on 04/12. PICC placed for long term abx.  - continue Unasyn  - appreciate ID recs  - continue postoperative pain control with scheduled acetaminophen, prn oxycodone  - continue vitamin D        Closed left hip fracture, sequela- (present on admission)   Assessment & Plan    GLF 3 days prior to admission, landed on left side. S/p I&D left hip on 4/9/18. No implants in left hip. Gram stain w some WBCs, rare gram pos cocci. Cultures NGTD. Vancomycin, ceftriaxone (04/09-04/12) changed to Unasyn (04/12-->)  - continue Unasyn  - continue pain control with scheduled acetaminophen, prn oxycodone  - supplement vitamin D   - continue Lovenox for DVT ppx        Iron deficiency anemia due to chronic blood loss- (present on admission)   Assessment & Plan    H/H 10/30 on admit and stable. History of Hemorrhoids. Bright red blood in the toilet with every bowel movement, no black stool. Pt had colonoscopy in the past which showed benign polyps. FOBT moderate, suspect chronic blood loss component from GI bleed vs perirectal abscess. Not acute blood loss due to surgery, as H/H stable since preop. Iron deficient, % saturation low, IV iron replacement started on 4/10. S/p IV iron.  - daily CBC   - Will transfuse if Hb < 7  - consider PPI, GI consult if decreasing H/H or persistent FOBT positive after wound controlled        Debility- (present on admission)   Assessment & Plan    Age 87, admitted for Left femoral head fracture & dislocation associated with surrounding abscess that has spread to rectum. GLF 3 weeks ago resulting in patient  becoming bed bound. Pt used wheelchair to ambulate at baseline.  - continue vitamin D supplementation  - continue pressure ulcer prevention protocol   - PT/OT pending        Fall from ground level- (present on admission)   Assessment & Plan    Multiple ground level falls for the last month & 3 weeks ago, she had a ground level fall & landed on her left side  - continue Vitamin D started   - PT/OT as tolerated        Hypertension- (present on admission)   Assessment & Plan    On lisinopril & chlorthalidone at home. Normotensive this admission.  - hold antihypertensives for BP support in the setting of sepsis         Dementia- (present on admission)   Assessment & Plan    - chronic   - will CTM

## 2018-04-15 NOTE — PROGRESS NOTES
Pt is AAOx4  Pt reports a 10/10 pain level  Medicated per MAR  Dressing to L hip CDI  PICC dressing CDI  Pt refuses to get out of bed and move to other side or lay on her back.  Education provided  Pt refused to us IS  POC discussed  All needs met at this time  Bed in low position  Call light within reach  Rounding in place

## 2018-04-15 NOTE — PROGRESS NOTES
Patient seen and examined  Pain controlled    No acute distress  Dressing clean dry and intact  Neurovascularly intact     POD#6     Plan:  DVT Prophylaxis- TEDS/SCDs  Weight Bearing Status-WBAT  PT/OT  Antibiotics: per ID  Case Coordination

## 2018-04-16 PROBLEM — K62.89 ANAL OR RECTAL PAIN: Status: ACTIVE | Noted: 2018-04-10

## 2018-04-16 LAB
BACTERIA UR CULT: ABNORMAL
BACTERIA UR CULT: ABNORMAL
BASOPHILS # BLD AUTO: 0.4 % (ref 0–1.8)
BASOPHILS # BLD: 0.07 K/UL (ref 0–0.12)
EKG IMPRESSION: NORMAL
EOSINOPHIL # BLD AUTO: 0.17 K/UL (ref 0–0.51)
EOSINOPHIL NFR BLD: 0.9 % (ref 0–6.9)
ERYTHROCYTE [DISTWIDTH] IN BLOOD BY AUTOMATED COUNT: 44.8 FL (ref 35.9–50)
HCT VFR BLD AUTO: 28.8 % (ref 37–47)
HGB BLD-MCNC: 9.4 G/DL (ref 12–16)
IMM GRANULOCYTES # BLD AUTO: 0.31 K/UL (ref 0–0.11)
IMM GRANULOCYTES NFR BLD AUTO: 1.6 % (ref 0–0.9)
LYMPHOCYTES # BLD AUTO: 2.16 K/UL (ref 1–4.8)
LYMPHOCYTES NFR BLD: 11.3 % (ref 22–41)
MAGNESIUM SERPL-MCNC: 1.7 MG/DL (ref 1.5–2.5)
MCH RBC QN AUTO: 29.8 PG (ref 27–33)
MCHC RBC AUTO-ENTMCNC: 32.6 G/DL (ref 33.6–35)
MCV RBC AUTO: 91.4 FL (ref 81.4–97.8)
MONOCYTES # BLD AUTO: 1.25 K/UL (ref 0–0.85)
MONOCYTES NFR BLD AUTO: 6.6 % (ref 0–13.4)
NEUTROPHILS # BLD AUTO: 15.08 K/UL (ref 2–7.15)
NEUTROPHILS NFR BLD: 79.2 % (ref 44–72)
NRBC # BLD AUTO: 0 K/UL
NRBC BLD-RTO: 0 /100 WBC
PHOSPHATE SERPL-MCNC: 2.6 MG/DL (ref 2.5–4.5)
PLATELET # BLD AUTO: 515 K/UL (ref 164–446)
PMV BLD AUTO: 8.9 FL (ref 9–12.9)
RBC # BLD AUTO: 3.15 M/UL (ref 4.2–5.4)
SIGNIFICANT IND 70042: ABNORMAL
SITE SITE: ABNORMAL
SOURCE SOURCE: ABNORMAL
WBC # BLD AUTO: 19 K/UL (ref 4.8–10.8)

## 2018-04-16 PROCEDURE — 700105 HCHG RX REV CODE 258: Performed by: INTERNAL MEDICINE

## 2018-04-16 PROCEDURE — 700102 HCHG RX REV CODE 250 W/ 637 OVERRIDE(OP): Performed by: INTERNAL MEDICINE

## 2018-04-16 PROCEDURE — 302146: Performed by: INTERNAL MEDICINE

## 2018-04-16 PROCEDURE — 93010 ELECTROCARDIOGRAM REPORT: CPT | Performed by: INTERNAL MEDICINE

## 2018-04-16 PROCEDURE — A9270 NON-COVERED ITEM OR SERVICE: HCPCS | Performed by: INTERNAL MEDICINE

## 2018-04-16 PROCEDURE — 85025 COMPLETE CBC W/AUTO DIFF WBC: CPT

## 2018-04-16 PROCEDURE — 700111 HCHG RX REV CODE 636 W/ 250 OVERRIDE (IP): Performed by: INTERNAL MEDICINE

## 2018-04-16 PROCEDURE — 99232 SBSQ HOSP IP/OBS MODERATE 35: CPT | Mod: GC | Performed by: INTERNAL MEDICINE

## 2018-04-16 PROCEDURE — 700102 HCHG RX REV CODE 250 W/ 637 OVERRIDE(OP): Performed by: STUDENT IN AN ORGANIZED HEALTH CARE EDUCATION/TRAINING PROGRAM

## 2018-04-16 PROCEDURE — 83735 ASSAY OF MAGNESIUM: CPT

## 2018-04-16 PROCEDURE — 84100 ASSAY OF PHOSPHORUS: CPT

## 2018-04-16 PROCEDURE — 770021 HCHG ROOM/CARE - ISO PRIVATE

## 2018-04-16 PROCEDURE — 700111 HCHG RX REV CODE 636 W/ 250 OVERRIDE (IP): Performed by: STUDENT IN AN ORGANIZED HEALTH CARE EDUCATION/TRAINING PROGRAM

## 2018-04-16 PROCEDURE — A9270 NON-COVERED ITEM OR SERVICE: HCPCS | Performed by: STUDENT IN AN ORGANIZED HEALTH CARE EDUCATION/TRAINING PROGRAM

## 2018-04-16 PROCEDURE — 700102 HCHG RX REV CODE 250 W/ 637 OVERRIDE(OP): Performed by: ORTHOPAEDIC SURGERY

## 2018-04-16 PROCEDURE — 93005 ELECTROCARDIOGRAM TRACING: CPT | Performed by: STUDENT IN AN ORGANIZED HEALTH CARE EDUCATION/TRAINING PROGRAM

## 2018-04-16 PROCEDURE — A9270 NON-COVERED ITEM OR SERVICE: HCPCS | Performed by: ORTHOPAEDIC SURGERY

## 2018-04-16 RX ORDER — MAGNESIUM SULFATE HEPTAHYDRATE 40 MG/ML
4 INJECTION, SOLUTION INTRAVENOUS ONCE
Status: COMPLETED | OUTPATIENT
Start: 2018-04-16 | End: 2018-04-16

## 2018-04-16 RX ADMIN — AMPICILLIN SODIUM AND SULBACTAM SODIUM 1.5 G: 1; .5 INJECTION, POWDER, FOR SOLUTION INTRAMUSCULAR; INTRAVENOUS at 03:09

## 2018-04-16 RX ADMIN — AMPICILLIN SODIUM AND SULBACTAM SODIUM 1.5 G: 1; .5 INJECTION, POWDER, FOR SOLUTION INTRAMUSCULAR; INTRAVENOUS at 09:27

## 2018-04-16 RX ADMIN — VITAMIN D, TAB 1000IU (100/BT) 1000 UNITS: 25 TAB at 09:26

## 2018-04-16 RX ADMIN — AMITRIPTYLINE HYDROCHLORIDE 25 MG: 25 TABLET, FILM COATED ORAL at 20:46

## 2018-04-16 RX ADMIN — ENOXAPARIN SODIUM 40 MG: 100 INJECTION SUBCUTANEOUS at 09:27

## 2018-04-16 RX ADMIN — MAGNESIUM SULFATE IN WATER 4 G: 40 INJECTION, SOLUTION INTRAVENOUS at 11:06

## 2018-04-16 RX ADMIN — DIBASIC SODIUM PHOSPHATE, MONOBASIC POTASSIUM PHOSPHATE AND MONOBASIC SODIUM PHOSPHATE 1 TABLET: 852; 155; 130 TABLET ORAL at 17:00

## 2018-04-16 RX ADMIN — AMPICILLIN SODIUM AND SULBACTAM SODIUM 1.5 G: 1; .5 INJECTION, POWDER, FOR SOLUTION INTRAMUSCULAR; INTRAVENOUS at 15:07

## 2018-04-16 RX ADMIN — OXYCODONE HYDROCHLORIDE 10 MG: 10 TABLET ORAL at 20:46

## 2018-04-16 RX ADMIN — DIBASIC SODIUM PHOSPHATE, MONOBASIC POTASSIUM PHOSPHATE AND MONOBASIC SODIUM PHOSPHATE 1 TABLET: 852; 155; 130 TABLET ORAL at 04:55

## 2018-04-16 RX ADMIN — HYDROCORTISONE ACETATE 25 MG: 25 SUPPOSITORY RECTAL at 09:24

## 2018-04-16 RX ADMIN — DIBASIC SODIUM PHOSPHATE, MONOBASIC POTASSIUM PHOSPHATE AND MONOBASIC SODIUM PHOSPHATE 1 TABLET: 852; 155; 130 TABLET ORAL at 11:06

## 2018-04-16 RX ADMIN — POLYETHYLENE GLYCOL 3350 1 PACKET: 17 POWDER, FOR SOLUTION ORAL at 04:55

## 2018-04-16 RX ADMIN — OXYCODONE HYDROCHLORIDE 10 MG: 10 TABLET ORAL at 13:14

## 2018-04-16 ASSESSMENT — ENCOUNTER SYMPTOMS
BLURRED VISION: 0
SEIZURES: 0
BRUISES/BLEEDS EASILY: 0
CHILLS: 0
HEMOPTYSIS: 0
NAUSEA: 0
SHORTNESS OF BREATH: 0
TREMORS: 0
FEVER: 0
DOUBLE VISION: 0
COUGH: 0
PALPITATIONS: 0
SORE THROAT: 0
DIZZINESS: 0
NECK PAIN: 0
BACK PAIN: 0
VOMITING: 0

## 2018-04-16 ASSESSMENT — PAIN SCALES - GENERAL
PAINLEVEL_OUTOF10: 10
PAINLEVEL_OUTOF10: 4
PAINLEVEL_OUTOF10: 3

## 2018-04-16 NOTE — CARE PLAN
Problem: Communication  Goal: The ability to communicate needs accurately and effectively will improve  Outcome: PROGRESSING AS EXPECTED  Reoriented pt to environment as needed; white board updated with [facility] staff and return time. Pt notified of hourly rounding and unit routine.    Appropriate signs in place at doorway for pt. Pt allowed time to ask questions; no questions at this time. Pt will frequently yell out to torres; education given on call light and call light response time expectations. A&O x 4; pt able to make needs known.     Problem: Safety  Goal: Will remain free from injury  Outcome: PROGRESSING AS EXPECTED  Pt uses call light appropriately; treaded socks in use. White board updated with [facility] staff. Personal belongings and call light with in reach. Bed locked in lowest position. Hourly rounding in place. Bed alarm on. Room near nursing station. Proper signs in place.

## 2018-04-16 NOTE — NON-PROVIDER
Internal Medicine Interval Note  Note Author: Devon Hernandez, Student     Name Lizette Means     3/8/1931   Age/Sex 87 y.o. female   MRN [id number]   Code Status DNR     After 5PM or if no immediate response to page, please call for cross-coverage  Attending/Team: Dr. Morales See Patient List for primary contact information  Call [phone] to page    1st Call - Day Intern (R1):   Dr. Joshi 2nd Call - Day Sr. Resident (R2/R3):   Dr. Escobar         Reason for interval visit  (Principal Problem)   Sepsis (CMS-[facility])    87 year old female who presented to [facility] as a transfer from [facility] with osteomyelitis. She is s/p I&D of hip. Originally on vancomycin and ceftriaxone. Switched to amp/sulbactam IV 1.5 grams every 6 hours. PICC line placed with anticipation of 6 weeks of antibiotics     Interval Problem Daily Status Update  (24 hours)   - Rectal pain has returned, severe with BM   - waiting on MRI  - EKG for persistent tachycardia showed same with sinus tachycardia with PACs and old anteroseptal infarct  - Has had multiple bowel movements today, first since   - Increased alk phos to 141    ROS  Constitutional: Negative for chills, fever and weight loss.   HENT: Negative for hearing loss.    Eyes: Negative for blurred vision and photophobia.   Respiratory: Negative for cough and shortness of breath.    Cardiovascular: Negative for chest pain, palpitations and leg swelling.   Gastrointestinal:. Negative for abdominal pain, constipation, heartburn, nausea and vomiting.   Genitourinary: Negative for dysuria and urgency. Rectal pain  Neurological: Negative for dizziness, loss of consciousness and headaches.     Consultants/Specialty  Ortho  I&D     Disposition  Inpatient for management of septic arthritis       Quality-Core Measures  Reviewed items::  EKG reviewed, Medications reviewed, Labs reviewed and Radiology images reviewed  Bull catheter:: No Bull    DVT prophylaxis pharmacological::   Enoxaparin (Lovenox) and SCDs  Antibiotics:  Treating active infection/contamination beyond 24 hours perioperative coverage      Physical Exam       Vitals:    04/15/18 1144 04/15/18 1530 04/15/18 2000 04/16/18 0400   BP: 139/75 112/65 144/75 119/72   Pulse: 66 (!) 124 (!) 120 (!) 121   Resp: 16 18 16 17   Temp: 37.4 °C (99.4 °F) 37.9 °C (100.2 °F) 36.3 °C (97.3 °F) 36.6 °C (97.8 °F)   SpO2: 95% 92% 95% 91%   Weight:       Height:         Body mass index is 20.98 kg/m².    Oxygen Therapy:  Pulse Oximetry: 91 %, O2 (LPM): 0, O2 Delivery: None (Room Air)    Physical Exam  Constitutional: She is oriented to person, place, and time and well-developed, well-nourished.   HENT:   Head: Normocephalic and atraumatic.   Eyes: Pupils are equal, round, and reactive to light.   Neck: Normal range of motion. Neck supple.   Cardiovascular: Normal rate and regular rhythm.    Pulmonary/Chest: Effort normal and breath sounds normal.   Abdominal: She exhibits no distension and no mass. No tenderness or guarding   Musculoskeletal:   MSK exam limited by fractured left hip   Neurological: She is alert and oriented to person, place, and time.   Skin: Skin is warm and dry. No erythema.   Dressing clean, dry and intact. No ulcers noted    Lab Data Review:         4/16/2018  6:06 AM    Recent Labs      04/14/18   0856  04/15/18   0300  04/16/18   0310   SODIUM  133*  137   --    POTASSIUM  3.6  3.9   --    CHLORIDE  102  102   --    CO2  24  27   --    BUN  11  9   --    CREATININE  0.65  0.52   --    MAGNESIUM  1.7  2.0  1.7   PHOSPHORUS  2.7  3.2  2.6   CALCIUM  7.9*  7.9*   --        Recent Labs      04/14/18   0856  04/15/18   0300   ALTSGPT  17  17   ASTSGOT  24  23   ALKPHOSPHAT  143*  141*   TBILIRUBIN  0.5  0.5   GLUCOSE  175*  97       Recent Labs      04/14/18   0856  04/15/18   0300  04/16/18   0310   RBC  3.53*  3.31*  3.15*   HEMOGLOBIN  10.6*  9.8*  9.4*   HEMATOCRIT  31.3*  30.0*  28.8*   PLATELETCT  528*  522*  515*              Recent Labs      04/14/18   0856  04/15/18   0300  04/16/18   0310   WBC  21.3*  18.0*  19.0*   NEUTSPOLYS  80.20*  76.10*  79.20*   LYMPHOCYTES  12.00*  14.00*  11.30*   MONOCYTES  5.40  6.80  6.60   EOSINOPHILS  0.30  1.00  0.90   BASOPHILS  0.40  0.30  0.40   ASTSGOT  24  23   --    ALTSGPT  17  17   --    ALKPHOSPHAT  143*  141*   --    TBILIRUBIN  0.5  0.5   --            Assessment/Plan     #Rectal pain  - Reports chronic hemorrhoids managed with steroid cream  - Patient reported multiple days of 10/10 pain               - Pain localized to rectum               - Severe pain with bowel movements               - No pain over leg incision   - Most likely due to hemorrhoids vs fissure vs abscess   - WBC elevated at 19     Plan:  - Plan on MRI today to evaluate rectal pain  - Hydrocortisone 25mg suppository cream for rectal pain  - Patient instructed to ask for pain medication if needed  - Continue to monitor and control pain   - Pain control per orthopaedics with:              - scheduled acetaminophen PO 650mg Q6              - oxycodone 5 or 10mg PO Q3 PRN               - hydromorphone 0.5mg IV Q3 PRN     #Osteomyelitis  - Transferred from [place], with septic arthritis underwent I&D on 4/9               - WBC of 23.4 on admission              - Patient has been afebrile and has not shown any signs of infection since I&D  - Blood cultures are negative   - Urine culture negative   - ID review of gram stain showed multiple intracellular gram negative diplococci consistent with moraxella              - possible lung source  - PT states that patient would benefit from post acute therapy   - OT states that patient would benefit from further therapy at [facility]   - PICC line placed and in good position     Plan:  - Amp/sulbactam IV 1.5 grams every 6 hours               - Can switch to a 6 week course of ertapenem once discharged to [facility]  - Plan to get into seated position as tolerated      #Iron deficiency  anemia  - Hb of 10 on admission   - Previous colonscopy showed benign poylps      Plan:  - Plan to transfuse if Hb<7   - Iron sucrose 200mg IV daily for 5 doses     #Tachycardia with frequent PACs  - Patient has intermittent tachycardia with PACs  - Previously noted on EKGs on 4/10  - 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    - Repeat EKG on 4/16 showed same with sinus tachycardia with PACs     Plan:  - Monitor for persistent tachycardia   - Avoid cardiotoxic medications     #Rheumatoid arthritis  - Chronic condition for approximately 8 years  - Required her to use a wheelchair for mobility for approximately the last 6 months  - Does not ambulate   - Chronically manages with only pain medication     Plan:  - Follow up with outpatient physician for chronic management

## 2018-04-16 NOTE — PROGRESS NOTES
Pt A&O x 4. PRN pain medication given per MAR. Pt on RA with no shortness of breath. LLE hip dressing DCI. Pt uses call light frequently. Personal belongings and call light within reach. Bed alarm on. Urinary frequency during NOC shift; skin care in place. CHG bath given; PICC flushing and drawing blood with no difficulty. Discussed POC, safety, and mobility; pt has no questions at this time. Bed in lowest position with treaded socks on pt. Hourly rounding in place.

## 2018-04-16 NOTE — PROGRESS NOTES
Internal Medicine Interval Note  Note Author: Selene Joshi M.D.     Name Lizette Means     3/8/1931   Age/Sex 87 y.o. female   MRN [id number]   Code Status DNR     After 5PM or if no immediate response to page, please call for cross-coverage  Attending/Team: Dr. Morales /Mary See Patient List for primary contact information  Call [phone] to page    1st Call - Day Intern (R1):   Dr. Joshi 2nd Call - Day Sr. Resident (R2/R3):   Dr. Escobar         Reason for interval visit  (Principal Problem)   Sepsis (CMS-[facility])    Interval Problem Daily Status Update  (24 hours)   Continues to remain afebrile, WBC today up at 19  continued asymptomatic tachycardia to 120s. - sinus tachycardia vs PACs  PICC placed 2018, functioning well.  NGTD on clultures, per ID Dr. Fraire Gram stain shows gram _ve diplococci , likely Moraxella.   Continue Unasyn per ID for now and convert to Ertapenem when discharge to SNF.   Ongoing priyanka-rectal pain, had BM this am. A/w MRI      Review of Systems   Constitutional: Negative for chills and fever.   HENT: Negative for congestion and sore throat.    Eyes: Negative for blurred vision and double vision.   Respiratory: Negative for cough, hemoptysis and shortness of breath.    Cardiovascular: Negative for chest pain and palpitations.   Gastrointestinal: Negative for nausea and vomiting.   Genitourinary: Negative for dysuria and urgency.   Musculoskeletal: Negative for back pain and neck pain.   Skin: Negative for itching and rash.   Neurological: Negative for dizziness, tremors and seizures.   Endo/Heme/Allergies: Negative for environmental allergies. Does not bruise/bleed easily.       Consultants/Specialty  Orthopedic surgery  Infectious disease    Disposition  Inpatient for treatment of septic arthritis and inv of rectal pain    Quality Measures  Quality-Core Measures   Reviewed items::  EKG reviewed, Medications reviewed and Labs reviewed  Bull catheter::  No Bull  DVT  prophylaxis pharmacological::  Enoxaparin (Lovenox)  DVT prophylaxis - mechanical:  SCDs  Ulcer Prophylaxis::  Not indicated  Antibiotics:  Treating active infection/contamination beyond 24 hours perioperative coverage  Assessed for rehabilitation services:  Patient was assess for and/or received rehabilitation services during this hospitalization      Physical Exam     Vitals:    04/15/18 2000 04/16/18 0400 04/16/18 0800 04/16/18 1200   BP: 144/75 119/72 148/73 117/61   Pulse: (!) 120 (!) 121 93 (!) 108   Resp: 16 17 16 16   Temp:  36.6 °C (97.8 °F) 36.5 °C (97.7 °F) 37.9 °C (100.3 °F)   SpO2: 95% 91% 92% 94%   Weight:       Height:         Body mass index is 20.98 kg/m².    Oxygen Therapy:  Pulse Oximetry: 94 %, O2 (LPM): 0, O2 Delivery: None (Room Air)    Physical Exam   Constitutional: She is oriented to person, place, and time and well-developed, well-nourished, and in no distress.   HENT:   Head: Normocephalic and atraumatic.   Mouth/Throat: No oropharyngeal exudate.   Eyes: Conjunctivae are normal. Pupils are equal, round, and reactive to light. No scleral icterus.   Neck: Normal range of motion. Neck supple. No thyromegaly present.   Cardiovascular: Normal rate, regular rhythm, normal heart sounds and intact distal pulses.  Exam reveals no gallop and no friction rub.    No murmur heard.  Pulmonary/Chest: Effort normal and breath sounds normal. No respiratory distress. She has no wheezes. She has no rales.   Abdominal: Soft. Bowel sounds are normal. She exhibits no distension. There is no tenderness. There is no guarding.   Musculoskeletal: Normal range of motion. She exhibits no edema.   LLE neurovascularly intact with 5/5 strength, DP/PT pulses present. Sterile dressing to hip c/d/i, surrounding skin nonerythematous, nonedematous. Hemovac w serosanguinous drainage.   Lymphadenopathy:     She has no cervical adenopathy.   Neurological: She is alert and oriented to person, place, and time. No cranial nerve  deficit.   Skin: Skin is warm and dry.   Psychiatric: Mood and affect normal.       Lab Data Review:     Recent Labs      04/14/18   0856  04/15/18   0300  04/16/18   0310   SODIUM  133*  137   --    POTASSIUM  3.6  3.9   --    CHLORIDE  102  102   --    CO2  24  27   --    BUN  11  9   --    CREATININE  0.65  0.52   --    MAGNESIUM  1.7  2.0  1.7   PHOSPHORUS  2.7  3.2  2.6   CALCIUM  7.9*  7.9*   --        Recent Labs      04/14/18   0856  04/15/18   0300   ALTSGPT  17  17   ASTSGOT  24  23   ALKPHOSPHAT  143*  141*   TBILIRUBIN  0.5  0.5   GLUCOSE  175*  97       Recent Labs      04/14/18   0856  04/15/18   0300  04/16/18   0310   RBC  3.53*  3.31*  3.15*   HEMOGLOBIN  10.6*  9.8*  9.4*   HEMATOCRIT  31.3*  30.0*  28.8*   PLATELETCT  528*  522*  515*       Recent Labs      04/14/18   0856  04/15/18   0300  04/16/18   0310   WBC  21.3*  18.0*  19.0*   NEUTSPOLYS  80.20*  76.10*  79.20*   LYMPHOCYTES  12.00*  14.00*  11.30*   MONOCYTES  5.40  6.80  6.60   EOSINOPHILS  0.30  1.00  0.90   BASOPHILS  0.40  0.30  0.40   ASTSGOT  24  23   --    ALTSGPT  17  17   --    ALKPHOSPHAT  143*  141*   --    TBILIRUBIN  0.5  0.5   --            Assessment/Plan     * Sepsis (CMS-[facility])- (present on admission)   Assessment & Plan    This is sepsis (without associated acute organ dysfunction). SIRS 2/4 w tachycardia, leukocytosis on admission. Persistent. Source: left hip arthritis/osteomyelitis vs perirectal abscess. S/p sepsis protocol w abx, IVF. Cultures with NG. Vanco+Ceftriaxone changed to Unasyn on 04/12. PICC placed 4/14, functioning well. Persistent leukocytosis, tachycardia, and anal pain despite IV abx are concerning for perirectal abscess.  Gram -ve diplococci, likely MOraxella per Dr. Fraire  - continue Unasyn  - f/u cultures  - ID on board, appreciate recs  - MRI pelvis        Osteomyelitis (CMS-[facility])- (present on admission)   Assessment & Plan    Identified on CT scan, no MRI available. Transferred from [place] for Left femoral head fracture & dislocation associated with surrounding abscess that has spread to rectum which were found on  Imaging study. ED physician consulted Dr. Patel (Ortho) & pt was taken to OR urgently. Pt did not have any implants in the effected area. Vanco+Ceftriaxone changed to Unasyn on 04/12. PICC placed for long term abx.  - continue Unasyn  - appreciate ID recs, for ertapenem at discharge to   - continue postoperative pain control with scheduled acetaminophen, prn oxycodone  - continue vitamin D        Closed left hip fracture, sequela- (present on admission)   Assessment & Plan    GLF 3 days prior to admission, landed on left side. S/p I&D left hip on 4/9/18. No implants in left hip. Gram stain w some WBCs, likely gram -ve diplo, likely moraxella per ID, Dr. Fraire. Cultures NGTD. Vancomycin, ceftriaxone (04/09-04/12) changed to Unasyn (04/12-->)  WBC up a 19  - continue Unasyn, convert to ertapenem at discharge to SNF per Dr. Fraire  - continue pain control with scheduled acetaminophen, prn oxycodone  - supplement vitamin D   - continue Lovenox for DVT ppx        Iron deficiency anemia due to chronic blood loss- (present on admission)   Assessment & Plan    H/H 10/30 on admit and stable. History of Hemorrhoids. Bright red blood in the toilet with every bowel movement, no black stool. Pt had colonoscopy in the past which showed benign polyps. FOBT moderate, suspect chronic blood loss component from GI bleed vs perirectal abscess. Not acute blood loss due to surgery, as H/H stable since preop. Iron deficient, % saturation low, IV iron replacement started on 4/10. S/p IV iron.  - daily CBC   - Will transfuse if Hb < 7  - consider PPI, GI consult if decreasing H/H or persistent FOBT positive after wound controlled        Debility- (present on admission)   Assessment & Plan    Age 87, admitted for Left femoral head fracture & dislocation associated with surrounding abscess that  has spread to rectum. GLF 3 weeks ago resulting in patient becoming bed bound. Pt used wheelchair to ambulate at baseline.  - continue vitamin D supplementation  - continue pressure ulcer prevention protocol   - PT/OT pending  - a/w placement at [facility]/other SNF after MRI, PT, OT        Fall from ground level- (present on admission)   Assessment & Plan    Multiple ground level falls for the last month & 3 weeks ago, she had a ground level fall & landed on her left side  - continue Vitamin D started   - PT/OT   - a/w SNF after MRI        Anal or rectal pain- (present on admission)   Assessment & Plan    - rectal pain  - known hemorrhoids  - left hip abscess on CT on admission  - a/w MRI  - to SNF after MRI, PT and OT        Hypertension- (present on admission)   Assessment & Plan    On lisinopril & chlorthalidone at home. Normotensive this admission.  - hold antihypertensives for BP support in the setting of sepsis         Dementia- (present on admission)   Assessment & Plan    - chronic   - will CTM

## 2018-04-16 NOTE — ASSESSMENT & PLAN NOTE
- rectal pain  - known hemorrhoids  - left hip abscess on CT on admission  - patient couldn't lie still and flat for MRI, not for ativan  - repeat HUONG 4/17/18 - no pus / swelling / exquisite tenderness  - not for sedation, MRI cancelled  - to SNF

## 2018-04-16 NOTE — PROGRESS NOTES
"Patient seen and examined      Blood pressure 119/72, pulse (!) 121, temperature 36.6 °C (97.8 °F), resp. rate 17, height 1.676 m (5' 6\"), weight 59 kg (130 lb), SpO2 91 %, not currently breastfeeding.    Recent Labs      04/14/18   0856  04/15/18   0300  04/16/18   0310   WBC  21.3*  18.0*  19.0*   RBC  3.53*  3.31*  3.15*   HEMOGLOBIN  10.6*  9.8*  9.4*   HEMATOCRIT  31.3*  30.0*  28.8*   MCV  88.7  90.6  91.4   MCH  30.0  29.6  29.8   MCHC  33.9  32.7*  32.6*   RDW  43.7  44.6  44.8   PLATELETCT  528*  522*  515*   MPV  9.3  8.8*  8.9*       No acute distress  Dressing clean dry and intact  Neurovascularly intact    POD# 7 I&D left hip    Plan:  DVT Prophylaxis- TEDS/SCDs  Weight Bearing Status- wbat  PT/OT  Antibiotics: per ID  Case Coordination          "

## 2018-04-16 NOTE — PROGRESS NOTES
"Patient seen and examined    Blood pressure 119/72, pulse (!) 121, temperature 36.6 °C (97.8 °F), resp. rate 17, height 1.676 m (5' 6\"), weight 59 kg (130 lb), SpO2 91 %, not currently breastfeeding.    Recent Labs      04/14/18   0856  04/15/18   0300  04/16/18   0310   WBC  21.3*  18.0*  19.0*   RBC  3.53*  3.31*  3.15*   HEMOGLOBIN  10.6*  9.8*  9.4*   HEMATOCRIT  31.3*  30.0*  28.8*   MCV  88.7  90.6  91.4   MCH  30.0  29.6  29.8   MCHC  33.9  32.7*  32.6*   RDW  43.7  44.6  44.8   PLATELETCT  528*  522*  515*   MPV  9.3  8.8*  8.9*       No acute distress  Dressing clean dry and intact  Neurovascularly intact    POD#7    Plan:  DVT Prophylaxis- TEDS/SCDs  Weight Bearing Status-WBAT  PT/OT  Antibiotics: Per ID  Case Coordination          "

## 2018-04-16 NOTE — PROGRESS NOTES
Hourly rounding, call bell within reach. Patient educated about plan of care, pain management, call bell use, and mobility. No changes to patient condition today. Continues to complain of rectal pain, mri pending. Will monitor.

## 2018-04-17 ENCOUNTER — APPOINTMENT (OUTPATIENT)
Dept: RADIOLOGY | Facility: MEDICAL CENTER | Age: 83
DRG: 854 | End: 2018-04-17
Attending: INTERNAL MEDICINE
Payer: MEDICARE

## 2018-04-17 LAB
ALBUMIN SERPL BCP-MCNC: 2.1 G/DL (ref 3.2–4.9)
ALBUMIN/GLOB SERPL: 0.7 G/DL
ALP SERPL-CCNC: 109 U/L (ref 30–99)
ALT SERPL-CCNC: 12 U/L (ref 2–50)
ANION GAP SERPL CALC-SCNC: 4 MMOL/L (ref 0–11.9)
AST SERPL-CCNC: 13 U/L (ref 12–45)
BASOPHILS # BLD AUTO: 0.3 % (ref 0–1.8)
BASOPHILS # BLD: 0.04 K/UL (ref 0–0.12)
BILIRUB SERPL-MCNC: 0.4 MG/DL (ref 0.1–1.5)
BUN SERPL-MCNC: 12 MG/DL (ref 8–22)
CALCIUM SERPL-MCNC: 7.6 MG/DL (ref 8.5–10.5)
CHLORIDE SERPL-SCNC: 102 MMOL/L (ref 96–112)
CO2 SERPL-SCNC: 29 MMOL/L (ref 20–33)
CREAT SERPL-MCNC: 0.54 MG/DL (ref 0.5–1.4)
EOSINOPHIL # BLD AUTO: 0.15 K/UL (ref 0–0.51)
EOSINOPHIL NFR BLD: 1 % (ref 0–6.9)
ERYTHROCYTE [DISTWIDTH] IN BLOOD BY AUTOMATED COUNT: 45.1 FL (ref 35.9–50)
GLOBULIN SER CALC-MCNC: 3.1 G/DL (ref 1.9–3.5)
GLUCOSE SERPL-MCNC: 127 MG/DL (ref 65–99)
HCT VFR BLD AUTO: 25.8 % (ref 37–47)
HGB BLD-MCNC: 8.5 G/DL (ref 12–16)
IMM GRANULOCYTES # BLD AUTO: 0.21 K/UL (ref 0–0.11)
IMM GRANULOCYTES NFR BLD AUTO: 1.3 % (ref 0–0.9)
LYMPHOCYTES # BLD AUTO: 2.38 K/UL (ref 1–4.8)
LYMPHOCYTES NFR BLD: 15.1 % (ref 22–41)
MAGNESIUM SERPL-MCNC: 2.3 MG/DL (ref 1.5–2.5)
MCH RBC QN AUTO: 30 PG (ref 27–33)
MCHC RBC AUTO-ENTMCNC: 32.9 G/DL (ref 33.6–35)
MCV RBC AUTO: 91.2 FL (ref 81.4–97.8)
MONOCYTES # BLD AUTO: 1.26 K/UL (ref 0–0.85)
MONOCYTES NFR BLD AUTO: 8 % (ref 0–13.4)
NEUTROPHILS # BLD AUTO: 11.7 K/UL (ref 2–7.15)
NEUTROPHILS NFR BLD: 74.3 % (ref 44–72)
NRBC # BLD AUTO: 0 K/UL
NRBC BLD-RTO: 0 /100 WBC
PHOSPHATE SERPL-MCNC: 2.8 MG/DL (ref 2.5–4.5)
PLATELET # BLD AUTO: 467 K/UL (ref 164–446)
PMV BLD AUTO: 8.9 FL (ref 9–12.9)
POTASSIUM SERPL-SCNC: 3.5 MMOL/L (ref 3.6–5.5)
PROT SERPL-MCNC: 5.2 G/DL (ref 6–8.2)
RBC # BLD AUTO: 2.83 M/UL (ref 4.2–5.4)
SODIUM SERPL-SCNC: 135 MMOL/L (ref 135–145)
WBC # BLD AUTO: 15.7 K/UL (ref 4.8–10.8)

## 2018-04-17 PROCEDURE — A9270 NON-COVERED ITEM OR SERVICE: HCPCS | Performed by: STUDENT IN AN ORGANIZED HEALTH CARE EDUCATION/TRAINING PROGRAM

## 2018-04-17 PROCEDURE — 84100 ASSAY OF PHOSPHORUS: CPT

## 2018-04-17 PROCEDURE — 700112 HCHG RX REV CODE 229: Performed by: ORTHOPAEDIC SURGERY

## 2018-04-17 PROCEDURE — 700102 HCHG RX REV CODE 250 W/ 637 OVERRIDE(OP): Performed by: STUDENT IN AN ORGANIZED HEALTH CARE EDUCATION/TRAINING PROGRAM

## 2018-04-17 PROCEDURE — 770021 HCHG ROOM/CARE - ISO PRIVATE

## 2018-04-17 PROCEDURE — 700111 HCHG RX REV CODE 636 W/ 250 OVERRIDE (IP): Performed by: INTERNAL MEDICINE

## 2018-04-17 PROCEDURE — 700102 HCHG RX REV CODE 250 W/ 637 OVERRIDE(OP): Performed by: INTERNAL MEDICINE

## 2018-04-17 PROCEDURE — 700105 HCHG RX REV CODE 258

## 2018-04-17 PROCEDURE — 83735 ASSAY OF MAGNESIUM: CPT

## 2018-04-17 PROCEDURE — 700102 HCHG RX REV CODE 250 W/ 637 OVERRIDE(OP): Performed by: ORTHOPAEDIC SURGERY

## 2018-04-17 PROCEDURE — 80053 COMPREHEN METABOLIC PANEL: CPT

## 2018-04-17 PROCEDURE — A9270 NON-COVERED ITEM OR SERVICE: HCPCS | Performed by: INTERNAL MEDICINE

## 2018-04-17 PROCEDURE — A9270 NON-COVERED ITEM OR SERVICE: HCPCS | Performed by: ORTHOPAEDIC SURGERY

## 2018-04-17 PROCEDURE — 700105 HCHG RX REV CODE 258: Performed by: INTERNAL MEDICINE

## 2018-04-17 PROCEDURE — 85025 COMPLETE CBC W/AUTO DIFF WBC: CPT

## 2018-04-17 PROCEDURE — 99232 SBSQ HOSP IP/OBS MODERATE 35: CPT | Mod: GC | Performed by: INTERNAL MEDICINE

## 2018-04-17 PROCEDURE — 302255 BARRIER CREAM MOISTURE BAZA PROTECT (ZINC) 5OZ: Performed by: STUDENT IN AN ORGANIZED HEALTH CARE EDUCATION/TRAINING PROGRAM

## 2018-04-17 RX ORDER — POTASSIUM CHLORIDE 20 MEQ/1
40 TABLET, EXTENDED RELEASE ORAL ONCE
Status: COMPLETED | OUTPATIENT
Start: 2018-04-17 | End: 2018-04-17

## 2018-04-17 RX ORDER — SODIUM CHLORIDE 9 MG/ML
INJECTION, SOLUTION INTRAVENOUS
Status: COMPLETED
Start: 2018-04-17 | End: 2018-04-17

## 2018-04-17 RX ADMIN — AMITRIPTYLINE HYDROCHLORIDE 25 MG: 25 TABLET, FILM COATED ORAL at 20:48

## 2018-04-17 RX ADMIN — DIBASIC SODIUM PHOSPHATE, MONOBASIC POTASSIUM PHOSPHATE AND MONOBASIC SODIUM PHOSPHATE 1 TABLET: 852; 155; 130 TABLET ORAL at 06:16

## 2018-04-17 RX ADMIN — DIBASIC SODIUM PHOSPHATE, MONOBASIC POTASSIUM PHOSPHATE AND MONOBASIC SODIUM PHOSPHATE 1 TABLET: 852; 155; 130 TABLET ORAL at 17:02

## 2018-04-17 RX ADMIN — SODIUM CHLORIDE: 9 INJECTION, SOLUTION INTRAVENOUS at 00:15

## 2018-04-17 RX ADMIN — DOCUSATE SODIUM 100 MG: 100 CAPSULE ORAL at 22:04

## 2018-04-17 RX ADMIN — STANDARDIZED SENNA CONCENTRATE AND DOCUSATE SODIUM 1 TABLET: 8.6; 5 TABLET, FILM COATED ORAL at 22:04

## 2018-04-17 RX ADMIN — OXYCODONE HYDROCHLORIDE 5 MG: 5 TABLET ORAL at 01:29

## 2018-04-17 RX ADMIN — POTASSIUM CHLORIDE 40 MEQ: 1500 TABLET, EXTENDED RELEASE ORAL at 12:10

## 2018-04-17 RX ADMIN — OXYCODONE HYDROCHLORIDE 5 MG: 5 TABLET ORAL at 06:21

## 2018-04-17 RX ADMIN — DIBASIC SODIUM PHOSPHATE, MONOBASIC POTASSIUM PHOSPHATE AND MONOBASIC SODIUM PHOSPHATE 1 TABLET: 852; 155; 130 TABLET ORAL at 00:08

## 2018-04-17 RX ADMIN — OXYCODONE HYDROCHLORIDE 5 MG: 5 TABLET ORAL at 21:46

## 2018-04-17 RX ADMIN — VITAMIN D, TAB 1000IU (100/BT) 1000 UNITS: 25 TAB at 08:27

## 2018-04-17 RX ADMIN — ENOXAPARIN SODIUM 40 MG: 100 INJECTION SUBCUTANEOUS at 08:27

## 2018-04-17 RX ADMIN — AMPICILLIN SODIUM AND SULBACTAM SODIUM 1.5 G: 1; .5 INJECTION, POWDER, FOR SOLUTION INTRAMUSCULAR; INTRAVENOUS at 00:07

## 2018-04-17 RX ADMIN — DIBASIC SODIUM PHOSPHATE, MONOBASIC POTASSIUM PHOSPHATE AND MONOBASIC SODIUM PHOSPHATE 1 TABLET: 852; 155; 130 TABLET ORAL at 11:52

## 2018-04-17 RX ADMIN — AMPICILLIN SODIUM AND SULBACTAM SODIUM 1.5 G: 1; .5 INJECTION, POWDER, FOR SOLUTION INTRAMUSCULAR; INTRAVENOUS at 06:16

## 2018-04-17 RX ADMIN — AMPICILLIN SODIUM AND SULBACTAM SODIUM 1.5 G: 1; .5 INJECTION, POWDER, FOR SOLUTION INTRAMUSCULAR; INTRAVENOUS at 11:53

## 2018-04-17 RX ADMIN — AMPICILLIN SODIUM AND SULBACTAM SODIUM 1.5 G: 1; .5 INJECTION, POWDER, FOR SOLUTION INTRAMUSCULAR; INTRAVENOUS at 17:03

## 2018-04-17 RX ADMIN — OXYCODONE HYDROCHLORIDE 5 MG: 5 TABLET ORAL at 16:04

## 2018-04-17 ASSESSMENT — ENCOUNTER SYMPTOMS
SEIZURES: 0
BACK PAIN: 0
DOUBLE VISION: 0
NECK PAIN: 0
VOMITING: 0
BRUISES/BLEEDS EASILY: 0
HEMOPTYSIS: 0
SHORTNESS OF BREATH: 0
SORE THROAT: 0
BLURRED VISION: 0
CHILLS: 0
PALPITATIONS: 0
NAUSEA: 0
FEVER: 0
DIZZINESS: 0
TREMORS: 0
COUGH: 0

## 2018-04-17 ASSESSMENT — PAIN SCALES - GENERAL
PAINLEVEL_OUTOF10: 10
PAINLEVEL_OUTOF10: 10
PAINLEVEL_OUTOF10: 5
PAINLEVEL_OUTOF10: 0
PAINLEVEL_OUTOF10: 8
PAINLEVEL_OUTOF10: 5

## 2018-04-17 NOTE — PROGRESS NOTES
"Patient seen and examined    Blood pressure 112/54, pulse 89, temperature 37.1 °C (98.8 °F), resp. rate 16, height 1.676 m (5' 6\"), weight 59 kg (130 lb), SpO2 94 %, not currently breastfeeding.    Recent Labs      04/15/18   0300  04/16/18   0310  04/17/18   0132   WBC  18.0*  19.0*  15.7*   RBC  3.31*  3.15*  2.83*   HEMOGLOBIN  9.8*  9.4*  8.5*   HEMATOCRIT  30.0*  28.8*  25.8*   MCV  90.6  91.4  91.2   MCH  29.6  29.8  30.0   MCHC  32.7*  32.6*  32.9*   RDW  44.6  44.8  45.1   PLATELETCT  522*  515*  467*   MPV  8.8*  8.9*  8.9*       No acute distress  Dressing clean dry and intact  Neurovascularly intact    POD#8    Plan:  DVT Prophylaxis- TEDS/SCDs  Weight Bearing Status-WBAT  PT/OT  Antibiotics: per ID  Case Coordination          "

## 2018-04-17 NOTE — PROGRESS NOTES
[facility] white team called to inform that pt was unable to do MRI. Pt was unable to lay flat and still long enough to get the test done. Doctors informed that pt will most likely have to be sedated in order to get MRI done.

## 2018-04-17 NOTE — PROGRESS NOTES
Received report from day RN, assumed care of pt [id number].  Pt A&Ox4. PICC to LUE tko and ABX. Dressing was changed with sterile field as was not dry or intact.   Sequentials on. Dressing to rt hip CDI.   Pt calls frequently for bedpan.   Pt able to turn self. Bruising noted to extremities and redness groin; Duyen cream applied.  All needs met throughout shift.

## 2018-04-17 NOTE — CARE PLAN
Problem: Infection  Goal: Will remain free from infection    Intervention: Assess signs and symptoms of infection  Pt afebrile, labs monitored, IV ABX administered per order. PICC dressing changed with sterile field as prior dressing was not dry/intact.       Problem: Pain Management  Goal: Pain level will decrease to patient's comfort goal    Intervention: Follow pain managment plan developed in collaboration with patient and Interdisciplinary Team  Pt medicated per MAR for pain, pt states pain managed with current regimen.   Intervention: Educate and implement non-pharmacologic comfort measures. Examples: relaxation, distration, play therapy, activity therapy, massage, etc.  Non pharmacologic measures used in addition to medication.

## 2018-04-17 NOTE — NON-PROVIDER
Internal Medicine Interval Note  Note Author: Devon Hernandez, Student     Name Lizette Means     3/8/1931   Age/Sex 87 y.o. female   MRN [id number]   Code Status DNR     After 5PM or if no immediate response to page, please call for cross-coverage  Attending/Team: Dr. Morales See Patient List for primary contact information  Call [phone] to page    1st Call - Day Intern (R1):   Dr. Joshi 2nd Call - Day Sr. Resident (R2/R3):   Dr. Escobar         Reason for interval visit  (Principal Problem)   Sepsis (CMS-[facility])    87 year old female who presented to [facility] as a transfer from [place] with osteomyelitis. She is s/p I&D of hip. Originally on vancomycin and ceftriaxone. Switched to amp/sulbactam IV 1.5 grams every 6 hours. PICC line placed with anticipation of 6 weeks of antibiotics     Interval Problem Daily Status Update  (24 hours)   - Denies any rectal pain, says that the pain medication has been helping control her pain  - Patient stated that she is excited to work with the PT/OT team today if not in pain  - Planning on MRI this afternoon       ROS  Constitutional: Negative for chills, fever and weight loss.   HENT: Negative for hearing loss.    Eyes: Negative for blurred vision and photophobia.   Respiratory: Negative for cough and shortness of breath.    Cardiovascular: Negative for chest pain, palpitations and leg swelling.   Gastrointestinal:. Negative for abdominal pain, constipation, heartburn, nausea and vomiting.   Genitourinary: Negative for dysuria and urgency. Rectal pain  Neurological: Negative for dizziness, loss of consciousness and headaches.      Consultants/Specialty  Ortho  I&D     Disposition  Inpatient for management of septic arthritis       Quality-Core Measures  Reviewed items::  EKG reviewed, Medications reviewed, Labs reviewed and Radiology images reviewed  Bull catheter:: No Bull    DVT prophylaxis pharmacological::  Enoxaparin (Lovenox) and SCDs  Antibiotics:   Treating active infection/contamination beyond 24 hours perioperative coverage      Physical Exam       Vitals:    04/16/18 1200 04/16/18 1600 04/16/18 1940 04/17/18 0400   BP: 117/61 136/69 113/49 112/54   Pulse: (!) 108 85 99 89   Resp: 16 16 16 16   Temp: 37.9 °C (100.3 °F) 36.9 °C (98.5 °F) 37 °C (98.6 °F) 37.1 °C (98.8 °F)   SpO2: 94% 93% 92% 94%   Weight:       Height:         Body mass index is 20.98 kg/m².    Oxygen Therapy:  Pulse Oximetry: 94 %, O2 (LPM): 0, O2 Delivery: None (Room Air)    Physical Exam  Constitutional: She is oriented to person, place, and time and well-developed, well-nourished.   HENT:   Head: Normocephalic and atraumatic.   Eyes: Pupils are equal, round, and reactive to light.   Neck: Normal range of motion. Neck supple.   Cardiovascular: Normal rate and regular rhythm.    Pulmonary/Chest: Effort normal and breath sounds normal.   Abdominal: She exhibits no distension and no mass. No tenderness or guarding   Musculoskeletal:   MSK exam limited by fractured left hip   Neurological: She is alert and oriented to person, place, and time.   Skin: Skin is warm and dry. No erythema.   Dressing clean, dry and intact. No ulcers noted    Lab Data Review:         4/17/2018  6:12 AM    Recent Labs      04/14/18   0856  04/15/18   0300  04/16/18   0310  04/17/18   0132   SODIUM  133*  137   --   135   POTASSIUM  3.6  3.9   --   3.5*   CHLORIDE  102  102   --   102   CO2  24  27   --   29   BUN  11  9   --   12   CREATININE  0.65  0.52   --   0.54   MAGNESIUM  1.7  2.0  1.7  2.3   PHOSPHORUS  2.7  3.2  2.6  2.8   CALCIUM  7.9*  7.9*   --   7.6*       Recent Labs      04/14/18   0856  04/15/18   0300  04/17/18   0132   ALTSGPT  17  17  12   ASTSGOT  24  23  13   ALKPHOSPHAT  143*  141*  109*   TBILIRUBIN  0.5  0.5  0.4   GLUCOSE  175*  97  127*       Recent Labs      04/15/18   0300  04/16/18   0310  04/17/18   0132   RBC  3.31*  3.15*  2.83*   HEMOGLOBIN  9.8*  9.4*  8.5*   HEMATOCRIT  30.0*  28.8*   25.8*   PLATELETCT  522*  515*  467*       Recent Labs      04/14/18   0856  04/15/18   0300  04/16/18   0310  04/17/18   0132   WBC  21.3*  18.0*  19.0*  15.7*   NEUTSPOLYS  80.20*  76.10*  79.20*  74.30*   LYMPHOCYTES  12.00*  14.00*  11.30*  15.10*   MONOCYTES  5.40  6.80  6.60  8.00   EOSINOPHILS  0.30  1.00  0.90  1.00   BASOPHILS  0.40  0.30  0.40  0.30   ASTSGOT  24  23   --   13   ALTSGPT  17  17   --   12   ALKPHOSPHAT  143*  141*   --   109*   TBILIRUBIN  0.5  0.5   --   0.4           Assessment/Plan       #Rectal pain  - Reports chronic hemorrhoids managed with steroid cream  - Patient reported multiple days of 10/10 pain               - Pain localized to rectum               - Severe pain with bowel movements               - No pain over leg incision   - Most likely due to hemorrhoids vs fissure vs abscess   - WBC elevated throughout admission      Plan:  - Plan on MRI today to evaluate rectal pain  - Hydrocortisone 25mg suppository cream for rectal pain  - Patient instructed to ask for pain medication if needed  - Continue to monitor and control pain   - Pain control per orthopaedics with:              - scheduled acetaminophen PO 650mg Q6              - oxycodone 5 or 10mg PO Q3 PRN               - hydromorphone 0.5mg IV Q3 PRN     #Wandering atrial pacemaker   - Patient has intermittent tachycardia with frequent PACs and 3 distinct P wave morphologies   - Previously noted on EKGs on 4/10  - 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               - Repeat EKG on 4/16 showed same with sinus tachycardia with PACs     Plan:  - Monitor for persistent tachycardia   - Avoid cardiotoxic medications      #Osteomyelitis  #Sepsis   - Transferred from [place], with septic arthritis underwent I&D on 4/9               - SIRS 2/4 with WBC of 23.4 on admission with intermittent tachycardia              - Patient has been afebrile and has not shown any signs of infection since I&D  - Blood cultures negative   - Urine  culture negative   - ID review of gram stain showed multiple intracellular gram negative diplococci consistent with moraxella              - possible lung source  - PT states that patient would benefit from post acute therapy   - OT states that patient would benefit from further therapy at SNF   - PICC line placed and in good position      Plan:  - Amp/sulbactam IV 1.5 grams every 6 hours               - Can switch to a 6 week course of ertapenem once discharged to SNF  - Plan to get into seated position as tolerated   - Continue work with PT/OT       #Iron deficiency anemia  - Hb of 10 on admission   - Previous colonscopy showed benign poylps   - Has decreased to 8.5     Plan:  - Plan to transfuse if Hb<7   - Consider Iron sucrose 200mg IV     #Rheumatoid arthritis  - Chronic condition for approximately 8 years  - Required her to use a wheelchair for mobility for approximately the last 6 months  - Does not ambulate   - Chronically manages with only pain medication     Plan:  - Follow up with outpatient physician for chronic management

## 2018-04-17 NOTE — THERAPY
Attempted to see pt for OT tx. Pt declined at this time d/t pain and being tired. Pt requested OT to come back tomorrow. Will try again later as able.

## 2018-04-18 VITALS
OXYGEN SATURATION: 94 % | RESPIRATION RATE: 15 BRPM | WEIGHT: 130 LBS | DIASTOLIC BLOOD PRESSURE: 66 MMHG | SYSTOLIC BLOOD PRESSURE: 117 MMHG | HEIGHT: 66 IN | HEART RATE: 101 BPM | TEMPERATURE: 98.3 F | BODY MASS INDEX: 20.89 KG/M2

## 2018-04-18 LAB
BASOPHILS # BLD AUTO: 0.3 % (ref 0–1.8)
BASOPHILS # BLD: 0.06 K/UL (ref 0–0.12)
EOSINOPHIL # BLD AUTO: 0.11 K/UL (ref 0–0.51)
EOSINOPHIL NFR BLD: 0.5 % (ref 0–6.9)
ERYTHROCYTE [DISTWIDTH] IN BLOOD BY AUTOMATED COUNT: 47.2 FL (ref 35.9–50)
HCT VFR BLD AUTO: 26.5 % (ref 37–47)
HGB BLD-MCNC: 8.6 G/DL (ref 12–16)
IMM GRANULOCYTES # BLD AUTO: 0.24 K/UL (ref 0–0.11)
IMM GRANULOCYTES NFR BLD AUTO: 1.1 % (ref 0–0.9)
LYMPHOCYTES # BLD AUTO: 2.88 K/UL (ref 1–4.8)
LYMPHOCYTES NFR BLD: 13.3 % (ref 22–41)
MAGNESIUM SERPL-MCNC: 1.8 MG/DL (ref 1.5–2.5)
MCH RBC QN AUTO: 30.5 PG (ref 27–33)
MCHC RBC AUTO-ENTMCNC: 32.5 G/DL (ref 33.6–35)
MCV RBC AUTO: 94 FL (ref 81.4–97.8)
MONOCYTES # BLD AUTO: 1.55 K/UL (ref 0–0.85)
MONOCYTES NFR BLD AUTO: 7.1 % (ref 0–13.4)
NEUTROPHILS # BLD AUTO: 16.84 K/UL (ref 2–7.15)
NEUTROPHILS NFR BLD: 77.7 % (ref 44–72)
NRBC # BLD AUTO: 0 K/UL
NRBC BLD-RTO: 0 /100 WBC
PHOSPHATE SERPL-MCNC: 2.4 MG/DL (ref 2.5–4.5)
PLATELET # BLD AUTO: 516 K/UL (ref 164–446)
PMV BLD AUTO: 9.1 FL (ref 9–12.9)
RBC # BLD AUTO: 2.82 M/UL (ref 4.2–5.4)
WBC # BLD AUTO: 21.7 K/UL (ref 4.8–10.8)

## 2018-04-18 PROCEDURE — 700102 HCHG RX REV CODE 250 W/ 637 OVERRIDE(OP): Performed by: INTERNAL MEDICINE

## 2018-04-18 PROCEDURE — 700111 HCHG RX REV CODE 636 W/ 250 OVERRIDE (IP): Performed by: STUDENT IN AN ORGANIZED HEALTH CARE EDUCATION/TRAINING PROGRAM

## 2018-04-18 PROCEDURE — 700102 HCHG RX REV CODE 250 W/ 637 OVERRIDE(OP): Performed by: STUDENT IN AN ORGANIZED HEALTH CARE EDUCATION/TRAINING PROGRAM

## 2018-04-18 PROCEDURE — A9270 NON-COVERED ITEM OR SERVICE: HCPCS | Performed by: STUDENT IN AN ORGANIZED HEALTH CARE EDUCATION/TRAINING PROGRAM

## 2018-04-18 PROCEDURE — 700102 HCHG RX REV CODE 250 W/ 637 OVERRIDE(OP): Performed by: ORTHOPAEDIC SURGERY

## 2018-04-18 PROCEDURE — 87040 BLOOD CULTURE FOR BACTERIA: CPT | Mod: 91

## 2018-04-18 PROCEDURE — 700111 HCHG RX REV CODE 636 W/ 250 OVERRIDE (IP): Performed by: INTERNAL MEDICINE

## 2018-04-18 PROCEDURE — 85025 COMPLETE CBC W/AUTO DIFF WBC: CPT

## 2018-04-18 PROCEDURE — 99239 HOSP IP/OBS DSCHRG MGMT >30: CPT | Mod: GC | Performed by: INTERNAL MEDICINE

## 2018-04-18 PROCEDURE — 700105 HCHG RX REV CODE 258: Performed by: INTERNAL MEDICINE

## 2018-04-18 PROCEDURE — 83735 ASSAY OF MAGNESIUM: CPT

## 2018-04-18 PROCEDURE — 36415 COLL VENOUS BLD VENIPUNCTURE: CPT

## 2018-04-18 PROCEDURE — 84100 ASSAY OF PHOSPHORUS: CPT

## 2018-04-18 PROCEDURE — A9270 NON-COVERED ITEM OR SERVICE: HCPCS | Performed by: ORTHOPAEDIC SURGERY

## 2018-04-18 PROCEDURE — A9270 NON-COVERED ITEM OR SERVICE: HCPCS | Performed by: INTERNAL MEDICINE

## 2018-04-18 RX ORDER — POTASSIUM CHLORIDE 20 MEQ/1
20 TABLET, EXTENDED RELEASE ORAL 2 TIMES DAILY
Qty: 60 TAB | Refills: 11
Start: 2018-04-18

## 2018-04-18 RX ORDER — LISINOPRIL 10 MG/1
10 TABLET ORAL DAILY
Qty: 30 TAB
Start: 2018-04-18

## 2018-04-18 RX ORDER — MECLIZINE HYDROCHLORIDE 25 MG/1
25 TABLET ORAL 3 TIMES DAILY PRN
Qty: 30 TAB | Refills: 0
Start: 2018-04-18

## 2018-04-18 RX ORDER — HYDROCORTISONE ACETATE 25 MG/1
25 SUPPOSITORY RECTAL EVERY 12 HOURS PRN
Refills: 0
Start: 2018-04-18

## 2018-04-18 RX ORDER — BISACODYL 10 MG
10 SUPPOSITORY, RECTAL RECTAL
Refills: 0
Start: 2018-04-18

## 2018-04-18 RX ORDER — PSEUDOEPHEDRINE HCL 30 MG
100 TABLET ORAL 2 TIMES DAILY
Qty: 60 CAP
Start: 2018-04-18

## 2018-04-18 RX ORDER — ONDANSETRON 2 MG/ML
4 INJECTION INTRAMUSCULAR; INTRAVENOUS EVERY 4 HOURS PRN
Qty: 84 ML
Start: 2018-04-18

## 2018-04-18 RX ORDER — OXYCODONE HYDROCHLORIDE 5 MG/1
5 TABLET ORAL EVERY 8 HOURS PRN
Qty: 45 TAB | Refills: 0
Start: 2018-04-18 | End: 2018-05-03

## 2018-04-18 RX ORDER — CHLORTHALIDONE 25 MG/1
25 TABLET ORAL DAILY
Qty: 30 TAB
Start: 2018-04-18

## 2018-04-18 RX ORDER — POLYETHYLENE GLYCOL 3350 17 G/17G
17 POWDER, FOR SOLUTION ORAL 2 TIMES DAILY PRN
Refills: 3
Start: 2018-04-18

## 2018-04-18 RX ORDER — AMITRIPTYLINE HYDROCHLORIDE 25 MG/1
25 TABLET, FILM COATED ORAL
Qty: 30 TAB
Start: 2018-04-18

## 2018-04-18 RX ORDER — AMOXICILLIN 250 MG
1 CAPSULE ORAL DAILY
Qty: 30 TAB | Refills: 0
Start: 2018-04-18

## 2018-04-18 RX ORDER — MAGNESIUM SULFATE HEPTAHYDRATE 40 MG/ML
2 INJECTION, SOLUTION INTRAVENOUS ONCE
Status: COMPLETED | OUTPATIENT
Start: 2018-04-18 | End: 2018-04-18

## 2018-04-18 RX ADMIN — OXYCODONE HYDROCHLORIDE 5 MG: 5 TABLET ORAL at 08:21

## 2018-04-18 RX ADMIN — DIBASIC SODIUM PHOSPHATE, MONOBASIC POTASSIUM PHOSPHATE AND MONOBASIC SODIUM PHOSPHATE 1 TABLET: 852; 155; 130 TABLET ORAL at 00:51

## 2018-04-18 RX ADMIN — MAGNESIUM SULFATE IN WATER 2 G: 40 INJECTION, SOLUTION INTRAVENOUS at 08:18

## 2018-04-18 RX ADMIN — DIBASIC SODIUM PHOSPHATE, MONOBASIC POTASSIUM PHOSPHATE AND MONOBASIC SODIUM PHOSPHATE 1 TABLET: 852; 155; 130 TABLET ORAL at 05:19

## 2018-04-18 RX ADMIN — VITAMIN D, TAB 1000IU (100/BT) 1000 UNITS: 25 TAB at 08:21

## 2018-04-18 RX ADMIN — AMPICILLIN SODIUM AND SULBACTAM SODIUM 1.5 G: 1; .5 INJECTION, POWDER, FOR SOLUTION INTRAMUSCULAR; INTRAVENOUS at 05:19

## 2018-04-18 RX ADMIN — AMPICILLIN SODIUM AND SULBACTAM SODIUM 1.5 G: 1; .5 INJECTION, POWDER, FOR SOLUTION INTRAMUSCULAR; INTRAVENOUS at 00:51

## 2018-04-18 RX ADMIN — DIBASIC SODIUM PHOSPHATE, MONOBASIC POTASSIUM PHOSPHATE AND MONOBASIC SODIUM PHOSPHATE 1 TABLET: 852; 155; 130 TABLET ORAL at 11:12

## 2018-04-18 RX ADMIN — ENOXAPARIN SODIUM 40 MG: 100 INJECTION SUBCUTANEOUS at 08:21

## 2018-04-18 RX ADMIN — OXYCODONE HYDROCHLORIDE 5 MG: 5 TABLET ORAL at 01:13

## 2018-04-18 RX ADMIN — OXYCODONE HYDROCHLORIDE 5 MG: 5 TABLET ORAL at 13:48

## 2018-04-18 RX ADMIN — AMPICILLIN SODIUM AND SULBACTAM SODIUM 1.5 G: 1; .5 INJECTION, POWDER, FOR SOLUTION INTRAMUSCULAR; INTRAVENOUS at 11:12

## 2018-04-18 ASSESSMENT — PAIN SCALES - GENERAL
PAINLEVEL_OUTOF10: 5
PAINLEVEL_OUTOF10: 6
PAINLEVEL_OUTOF10: 6
PAINLEVEL_OUTOF10: 9

## 2018-04-18 NOTE — PROGRESS NOTES
Report called to LEDA Lowe at [facility]. PICC line flushed and saline locked for transport. Reminded patient that she is leaving around 1500 to go to rehab. Discharge instructions, script for oxy, and transport papers in packet with chart. Will give to transport when they arrive.

## 2018-04-18 NOTE — PROGRESS NOTES
[facility] Mary Team MD, Dr. [place] updated regarding patient fever of 99.7f oral. No new orders received. All other VSS at this time. RN will continue to monitor.

## 2018-04-18 NOTE — NON-PROVIDER
Internal Medicine Interval Note  Note Author: Devon Hernandez, Student     Name Lizette Means     3/8/1931   Age/Sex 87 y.o. female   MRN [id number]   Code Status DNR     After 5PM or if no immediate response to page, please call for cross-coverage  Attending/Team: Dr. Morales See Patient List for primary contact information  Call [phone] to page    1st Call - Day Intern (R1):   Dr. Joshi  2nd Call - Day Sr. Resident (R2/R3):   Dr. Escobar         Reason for interval visit  (Principal Problem)   Sepsis ([facility])    87 year old female who presented to [facility] as a transfer from [facility] with osteomyelitis. She is s/p I&D of hip. Originally on vancomycin and ceftriaxone. Switched to amp/sulbactam IV 1.5 grams every 6 hours. PICC line placed with anticipation of 6 weeks of antibiotics     Interval Problem Daily Status Update  (24 hours)   - Reports decreased rectal pain today  - Interested in working with PT/OT  - WBC increased to 21.7 from 15.7 yesterday  - Plan to discharge to a SNF when a space becomes available     ROS  Constitutional: Negative for chills, fever and weight loss.   HENT: Negative for hearing loss.    Eyes: Negative for blurred vision and photophobia.   Respiratory: Negative for cough and shortness of breath.    Cardiovascular: Negative for chest pain, palpitations and leg swelling.   Gastrointestinal:. Negative for abdominal pain, constipation, heartburn, nausea and vomiting.   Genitourinary: Negative for dysuria and urgency.   Neurological: Negative for dizziness, loss of consciousness and headaches.      Consultants/Specialty  Ortho  I&D     Disposition  Discharge to SNF      Quality-Core Measures  Reviewed items::  EKG reviewed, Medications reviewed, Labs reviewed and Radiology images reviewed  Bull catheter:: No Bull    DVT prophylaxis pharmacological::  Enoxaparin (Lovenox) and SCDs  Antibiotics:  Treating active infection/contamination beyond 24 hours perioperative        Physical Exam       Vitals:    04/17/18 2000 04/17/18 2155 04/18/18 0000 04/18/18 0400   BP: 123/61  124/63 107/48   Pulse: 61  (!) 110 97   Resp: 18 17 17   Temp: 37.6 °C (99.7 °F) 37.6 °C (99.7 °F) 37.6 °C (99.7 °F) 37.3 °C (99.1 °F)   SpO2: 93%  90% 91%   Weight:       Height:         Body mass index is 20.98 kg/m².    Oxygen Therapy:  Pulse Oximetry: 91 %, O2 (LPM): 0, O2 Delivery: None (Room Air)    Physical Exam  Constitutional: She is oriented to person, place, and time and well-developed, well-nourished.   HENT:   Head: Normocephalic and atraumatic.   Eyes: Pupils are equal, round, and reactive to light.   Neck: Normal range of motion. Neck supple.   Cardiovascular: Normal rate and regular rhythm.    Pulmonary/Chest: Effort normal and breath sounds normal.   Abdominal: She exhibits no distension and no mass. No tenderness or guarding   Musculoskeletal:   MSK exam limited by fractured left hip   Neurological: She is alert and oriented to person, place, and time.   Skin: Skin is warm and dry. No erythema.   Dressing clean, dry and intact. No ulcers noted    Lab Data Review:         4/18/2018  5:56 AM    Recent Labs      04/16/18 0310 04/17/18 0132 04/18/18 0058   SODIUM   --   135   --    POTASSIUM   --   3.5*   --    CHLORIDE   --   102   --    CO2   --   29   --    BUN   --   12   --    CREATININE   --   0.54   --    MAGNESIUM  1.7  2.3  1.8   PHOSPHORUS  2.6  2.8  2.4*   CALCIUM   --   7.6*   --        Recent Labs      04/17/18 0132   ALTSGPT  12   ASTSGOT  13   ALKPHOSPHAT  109*   TBILIRUBIN  0.4   GLUCOSE  127*       Recent Labs      04/16/18 0310 04/17/18 0132 04/18/18 0058   RBC  3.15*  2.83*  2.82*   HEMOGLOBIN  9.4*  8.5*  8.6*   HEMATOCRIT  28.8*  25.8*  26.5*   PLATELETCT  515*  467*  516*       Recent Labs      04/16/18   0310  04/17/18   0132  04/18/18   0058   WBC  19.0*  15.7*  21.7*   NEUTSPOLYS  79.20*  74.30*  77.70*   LYMPHOCYTES  11.30*  15.10*  13.30*   MONOCYTES   6.60  8.00  7.10   EOSINOPHILS  0.90  1.00  0.50   BASOPHILS  0.40  0.30  0.30   ASTSGOT   --   13   --    ALTSGPT   --   12   --    ALKPHOSPHAT   --   109*   --    TBILIRUBIN   --   0.4   --            Assessment/Plan     #Rectal pain  - Reports chronic hemorrhoids managed with steroid cream  - Patient reported multiple days of 10/10 pain               - Pain localized to rectum               - Severe pain with bowel movements               - No pain over leg incision   - Most likely due to hemorrhoids vs fissure vs abscess   - WBC elevated throughout admission      Plan:  - Hydrocortisone 25mg suppository cream for rectal pain  - Patient instructed to ask for pain medication if needed  - Continue to monitor and control pain   - Pain control per orthopaedics with:              - scheduled acetaminophen PO 650mg Q6              - oxycodone 5 or 10mg PO Q3 PRN               - hydromorphone 0.5mg IV Q3 PRN      #Wandering atrial pacemaker   - Patient has intermittent tachycardia with frequent PACs and 3 distinct P wave morphologies   - Previously noted on EKGs on 4/10  - 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               - Repeat EKG on 4/16 showed same with sinus tachycardia with PACs     Plan:  - Monitor for persistent tachycardia   - Avoid cardiotoxic medications       #Osteomyelitis  #Sepsis   - Transferred from [place], with septic arthritis underwent I&D on 4/9               - SIRS 2/4 with WBC of 23.4 on admission with intermittent tachycardia              - Patient has been afebrile and has not shown any signs of infection since I&D  - Blood cultures negative   - Urine culture negative   - ID review of gram stain showed multiple intracellular gram negative diplococci consistent with moraxella              - possible lung source  - PT states that patient would benefit from post acute therapy   - OT states that patient would benefit from further [facility] at [facility]   - PICC line placed and in good position       Plan:  - Amp/sulbactam IV 1.5 grams every 6 hours               - Can switch to a 6 week course of ertapenem once discharged to SNF  - Plan to get into seated position as tolerated   - Continue work with PT/OT  - Plan to discharge to a SNF for completion of 6 weeks of antibiotics (ertapenem)         #Iron deficiency anemia  - Hb of 10 on admission   - Previous colonscopy showed benign poylps   - Has decreased to 8.6     Plan:  - Plan to transfuse if Hb<7   - Consider Iron replacement after discharge      #Rheumatoid arthritis  - Chronic condition for approximately 8 years  - Required her to use a wheelchair for mobility for approximately the last 6 months  - Does not ambulate   - Chronically manages with only pain medication

## 2018-04-18 NOTE — PROGRESS NOTES
Assumed care of the pt at 1915, received report from the day shift RN.    Pt is AOx4, no complaint of discomfort at this time.    Pt has a single lumen PICC, SHYANN sancheso w/ abx. Dressing soiled and has been changed with sterile field.     Dressing present of L hip, CDI.    Pt educated to turn every 2 hours, pt does reposition her self in bed.    Pt is + 1 assist with bed pan. Pt calls appropriately. Bed alarm active.     Call light within reach, hourly rounding in place.

## 2018-04-18 NOTE — CARE PLAN
Problem: Communication  Goal: The ability to communicate needs accurately and effectively will improve    Intervention: Educate patient and significant other/support system about the plan of care, procedures, treatments, medications and allow for questions  Pt updated on POC, all questions have been answered at this time.      Problem: Pain Management  Goal: Pain level will decrease to patient's comfort goal    Intervention: Follow pain managment plan developed in collaboration with patient and Interdisciplinary Team  Pt has been medicated for pain with PRN PO pain medication, see MAR. Pt uses the 0 to 10 pain reporting scale and 2 hour pain reassessments have been implemented.

## 2018-04-18 NOTE — DISCHARGE PLANNING
Received transportation form. Spoke with Ramonita at [facility], transportation set up for 1500 via [employer]. Glenny MIRAMONTES notified.

## 2018-04-18 NOTE — DISCHARGE PLANNING
Anticipated Discharge Disposition: SNF    Action: Pt has been medically cleared for transfer to SNF. Pt has been accepted to [facility] by Dr. Lopez. LSW called [facility] and set up transport through [employer] for 1500. Pt was updated and agreeable to transfer. SW updated Daughter Megan, bedside RN, and completed the transfer packet.      Barriers to Discharge: None    Plan: Pt is medically clear to transfer to SNF at 1500.

## 2018-04-18 NOTE — DISCHARGE INSTRUCTIONS
Discharge Instructions    Discharged to other by medical transportation with escort. Discharged via ambulance, hospital escort: Yes.  Special equipment needed: Not Applicable    Be sure to schedule a follow-up appointment with your primary care doctor or any specialists as instructed.     Discharge Plan:   Influenza Vaccine Indication: Not indicated: Previously immunized this influenza season and > 8 years of age    I understand that a diet low in cholesterol, fat, and sodium is recommended for good health. Unless I have been given specific instructions below for another diet, I accept this instruction as my diet prescription.   Other diet: Regular    Special Instructions: Discharge instructions for the Orthopedic Patient    Follow up with Primary Care Physician within 2 weeks of discharge to home, regarding:  Review of medications and diagnostic testing.  Surveillance for medical complications.  Workup and treatment of osteoporosis, if appropriate.     -Is this a Joint Replacement patient? No    -Is this patient being discharged with medication to prevent blood clots? No      · Is patient discharged on Warfarin / Coumadin?   No     Ok to transfer to SNF. Will need PT, OT and IV Ertapenem to compete a full 6 week course of IV antibiotics as per ID recs. To switch from Unasyn to Ertapenem when at SNF.      Depression / Suicide Risk    As you are discharged from this [facility] facility, it is important to learn how to keep safe from harming yourself.    Recognize the warning signs:  · Abrupt changes in personality, positive or negative- including increase in energy   · Giving away possessions  · Change in eating patterns- significant weight changes-  positive or negative  · Change in sleeping patterns- unable to sleep or sleeping all the time   · Unwillingness or inability to communicate  · Depression  · Unusual sadness, discouragement and loneliness  · Talk of wanting to die  · Neglect of personal  appearance   · Rebelliousness- reckless behavior  · Withdrawal from people/activities they love  · Confusion- inability to concentrate     If you or a loved one observes any of these behaviors or has concerns about self-harm, here's what you can do:  · Talk about it- your feelings and reasons for harming yourself  · Remove any means that you might use to hurt yourself (examples: pills, rope, extension cords, firearm)  · Get professional help from the community (Mental Health, Substance Abuse, psychological counseling)  · Do not be alone:Call your Safe Contact- someone whom you trust who will be there for you.  · Call your local CRISIS HOTLINE 213-4321 or 587-720-8788  · Call your local Children's Mobile Crisis Response Team [place] (256) 269-3146 or www.[employer]  · Call the toll free National Suicide Prevention Hotlines   · National Suicide Prevention Lifeline 943-718-DTMY (4661)  · National Hope Line Network 800-SUICIDE (566-8579)

## 2018-04-18 NOTE — CARE PLAN
Problem: Nutritional:  Goal: Achieve adequate nutritional intake  Patient will consume 50% of meals   Outcome: MET Date Met: 04/18/18  PO 25-50% of meals.  Pt receiving Boost Plus BID (will meet >50% of pt's daily estimated nutrition needs.

## 2018-04-23 LAB
BACTERIA BLD CULT: NORMAL
BACTERIA BLD CULT: NORMAL
SIGNIFICANT IND 70042: NORMAL
SIGNIFICANT IND 70042: NORMAL
SITE SITE: NORMAL
SITE SITE: NORMAL
SOURCE SOURCE: NORMAL
SOURCE SOURCE: NORMAL

## 2018-04-24 ENCOUNTER — TELEPHONE (OUTPATIENT)
Dept: INTERNAL MEDICINE | Facility: MEDICAL CENTER | Age: 83
End: 2018-04-24

## 2018-04-24 NOTE — TELEPHONE ENCOUNTER
1. Caller Name: Dr Ramirez @ [facility]                      Call Back Number: No number was left    2. Message: Front office received message from Dr Ramirez at [facility] regarding pt. Pt started abx that Dr Fraire has called/ordered for pt is making her itch all over. They want to know what to do for pt.    3. Patient approves office to leave a detailed voicemail/MyChart message: N\A    Called [facility] and that I was return Dr Ramirez call from him/her trying to reach Dr Fraire regarding pt. Asked it I can have Dr Ramirez number to provide to Dr Fraire for him to call physician. They provided me with a paging number to have the Dr gray. #[phone].    Called and spoke with Dr Fraire. Provided Dr Fraire with the information above.

## 2018-05-02 ENCOUNTER — HOSPITAL ENCOUNTER (OUTPATIENT)
Dept: RADIOLOGY | Facility: MEDICAL CENTER | Age: 83
End: 2018-05-02
Attending: FAMILY MEDICINE
Payer: MEDICARE

## 2018-05-02 DIAGNOSIS — B99.9: ICD-10-CM

## 2018-05-02 DIAGNOSIS — T88.8XXA: ICD-10-CM

## 2018-05-02 PROCEDURE — 36569 INSJ PICC 5 YR+ W/O IMAGING: CPT

## 2018-05-02 NOTE — PROCEDURES
PICC Insertion Final 3CG    Consents confirmed, vessel patency confirmed with ultrasound. Risks and benefits of procedure explained to patient/family and education regarding central line associated bloodstream infections provided. Questions answered.     PICC placed in LUE per MD order with ultrasound guidance. 4 Fr, SINGLE lumen PICC placed in BRACHIAL vein after 1 attempt(s). 1 cc's of 1% lidocaine injected intradermally, 21 gauge microintroducer needle and modified Seldinger technique used. 0 cm catheter inserted with good blood return. Secured at 0 cm marker. Each lumen flushed without resistance with 10 mL 0.9% normal saline. PICC line secured with Biopatch and Tegaderm.    PICC placement is confirmed by nurse using 3CG technology. PICC line is appropriate for use at this time. Pt tolerated procedure WELL, NO C/O PAIN.  Patient condition relayed to unit RN or ordering physician via this post procedure note in the EMR.     BARD Power PICC ref # [id number], Lot # [id number]    LUE arm circumference 9 cm proximal to ac:  22 cm.

## (undated) DEVICE — SUTURE GENERAL

## (undated) DEVICE — BLADE SURGICAL #10 - (50/BX)

## (undated) DEVICE — LACTATED RINGERS INJ 1000 ML - (14EA/CA 60CA/PF)

## (undated) DEVICE — GLOVE BIOGEL INDICATOR SZ 8 SURGICAL PF LTX - (50/BX 4BX/CA)

## (undated) DEVICE — ELECTRODE 850 FOAM ADHESIVE - HYDROGEL RADIOTRNSPRNT (50/PK)

## (undated) DEVICE — CHLORAPREP 26 ML APPLICATOR - ORANGE TINT(25/CA)

## (undated) DEVICE — SUTURE 2-0 VICRYL PLUS CT-1 - 8 X 18 INCH(12/BX)

## (undated) DEVICE — SUTURE 0 PDS CT-1 CR 8 X 18 (12PK/BX)"

## (undated) DEVICE — GLOVE BIOGEL PI INDICATOR SZ 6.5 SURGICAL PF LF - (50/BX 4BX/CA)

## (undated) DEVICE — KIT EVACUATER 3 SPRING PVC LF 1/8 DRAIN SIZE (10EA/CA)"

## (undated) DEVICE — PACK MAJOR ORTHO - (2EA/CA)

## (undated) DEVICE — MASK ANESTHESIA ADULT  - (100/CA)

## (undated) DEVICE — GLOVE BIOGEL SZ 7.5 SURGICAL PF LTX - (50PR/BX 4BX/CA)

## (undated) DEVICE — HEAD HOLDER JUNIOR/ADULT

## (undated) DEVICE — CANISTER SUCTION 3000ML MECHANICAL FILTER AUTO SHUTOFF MEDI-VAC NONSTERILE LF DISP  (40EA/CA)

## (undated) DEVICE — DRESSING POST OP BORDER 4 X 10 (5EA/BX)

## (undated) DEVICE — SET EXTENSION WITH 2 PORTS (48EA/CA) ***PART #2C8610 IS A SUBSTITUTE*****

## (undated) DEVICE — SUTURE 2-0 ETHILON FS - (36/BX) 18 INCH

## (undated) DEVICE — GLOVE BIOGEL PI INDICATOR SZ 8.0 SURGICAL PF LF -(50/BX 4BX/CA)

## (undated) DEVICE — BLANKET WARMING UPPER BODY - (10/CA)

## (undated) DEVICE — TIP INTPLS HFLO ML ORFC BTRY - (12/CS)  FOR SURGILAV

## (undated) DEVICE — TUBING CLEARLINK DUO-VENT - C-FLO (48EA/CA)

## (undated) DEVICE — SODIUM CHL. IRRIGATION 0.9% 3000ML (4EA/CA 65CA/PF)

## (undated) DEVICE — ELECTRODE DUAL RETURN W/ CORD - (50/PK)

## (undated) DEVICE — DRAPE SURGICAL U 77X120 - (10/CA)

## (undated) DEVICE — GLOVE SZ 6 BIOGEL PI MICRO - PF LF (50PR/BX 4BX/CA)

## (undated) DEVICE — KIT ANESTHESIA W/CIRCUIT & 3/LT BAG W/FILTER (20EA/CA)

## (undated) DEVICE — HANDPIECE 10FT INTPLS SCT PLS IRRIGATION HAND CONTROL SET (6/PK)

## (undated) DEVICE — SET LEADWIRE 5 LEAD BEDSIDE DISPOSABLE ECG (1SET OF 5/EA)

## (undated) DEVICE — KIT ROOM DECONTAMINATION

## (undated) DEVICE — SET IRRIGATION CYSTOSCOPY TUBE L80 IN (20EA/CA)

## (undated) DEVICE — SENSOR SPO2 NEO LNCS ADHESIVE (20/BX) SEE USER NOTES

## (undated) DEVICE — NEPTUNE 4 PORT MANIFOLD - (20/PK)

## (undated) DEVICE — DRESSING TRANSPARENT FILM TEGADERM 4 X 4.75" (50EA/BX)"

## (undated) DEVICE — BLADE SAGITTAL SAW DUAL CUT 75.0 X 25.0MM (1/EA)

## (undated) DEVICE — PROTECTOR ULNA NERVE - (36PR/CA)

## (undated) DEVICE — SUCTION INSTRUMENT YANKAUER BULBOUS TIP W/O VENT (50EA/CA)

## (undated) DEVICE — DRAPE STRLE REG TOWEL 18X24 - (10/BX 4BX/CA)"

## (undated) DEVICE — CONNECTOR 5-IN-1 STERILE - (25EA/BX)